# Patient Record
Sex: FEMALE | Race: WHITE | NOT HISPANIC OR LATINO | Employment: PART TIME | URBAN - METROPOLITAN AREA
[De-identification: names, ages, dates, MRNs, and addresses within clinical notes are randomized per-mention and may not be internally consistent; named-entity substitution may affect disease eponyms.]

---

## 2018-04-18 ENCOUNTER — OFFICE VISIT (OUTPATIENT)
Dept: ORTHOPEDIC SURGERY | Facility: CLINIC | Age: 83
End: 2018-04-18

## 2018-04-18 VITALS
HEART RATE: 78 BPM | HEIGHT: 63 IN | WEIGHT: 158.73 LBS | SYSTOLIC BLOOD PRESSURE: 158 MMHG | DIASTOLIC BLOOD PRESSURE: 79 MMHG | BODY MASS INDEX: 28.12 KG/M2

## 2018-04-18 DIAGNOSIS — M19.012 PRIMARY OSTEOARTHRITIS OF LEFT SHOULDER: Primary | ICD-10-CM

## 2018-04-18 PROCEDURE — 99214 OFFICE O/P EST MOD 30 MIN: CPT | Performed by: ORTHOPAEDIC SURGERY

## 2018-04-18 RX ORDER — TRIAMTERENE AND HYDROCHLOROTHIAZIDE 37.5; 25 MG/1; MG/1
1 TABLET ORAL DAILY
COMMUNITY
Start: 2018-03-19

## 2018-04-18 RX ORDER — ALBUTEROL SULFATE 90 UG/1
POWDER, METERED RESPIRATORY (INHALATION)
Status: ON HOLD | COMMUNITY
Start: 2018-01-18 | End: 2022-05-12

## 2018-04-18 RX ORDER — POTASSIUM CHLORIDE 20 MEQ/1
TABLET, EXTENDED RELEASE ORAL
Status: ON HOLD | COMMUNITY
Start: 2018-04-02 | End: 2022-05-12

## 2018-04-18 RX ORDER — TRAZODONE HYDROCHLORIDE 50 MG/1
TABLET ORAL
Status: ON HOLD | COMMUNITY
Start: 2018-04-17 | End: 2022-05-12

## 2018-04-18 NOTE — PROGRESS NOTES
[facility] [place] [facility] Note    Subjective     Chief Complaint   Patient presents with   • Left Shoulder - Pain        HPI    Heidi Tomlinson is a 82 y.o. female. She presents today for evaluation of left shoulder pain.  She has pain in the shoulder for the past 6 months, following no particular injury.  The pain is mild to moderate, continuous, and aching and sharp in quality, associated with stiffness.  She has 2 small lumps, one anteriorly and the other posteriorly, which do not hurt.      There is no problem list on file for this patient.    Past Medical History:   Diagnosis Date   • Arthritis       Past Surgical History:   Procedure Laterality Date   • GALLBLADDER SURGERY  1968   • HIP SURGERY  2011    [facility]   • JOINT REPLACEMENT Left     Left Knee Arthropolasty    • TONSILLECTOMY  2004      Family History   Problem Relation Age of Onset   • Diabetes Mother    • Osteoarthritis Father      Social History     Social History   • Marital status:      Spouse name: N/A   • Number of children: N/A   • Years of education: N/A     Occupational History   • Not on file.     Social History Main Topics   • Smoking status: Never Smoker   • Smokeless tobacco: Never Used   • Alcohol use No   • Drug use: No   • Sexual activity: Defer     Other Topics Concern   • Not on file     Social History Narrative   • No narrative on file      No current outpatient prescriptions on file prior to visit.     No current facility-administered medications on file prior to visit.       Allergies   Allergen Reactions   • Codeine Nausea And Vomiting        Review of Systems   Constitutional: Negative for activity change, appetite change, chills, diaphoresis, fatigue, fever and unexpected weight change.   HENT: Positive for hearing loss. Negative for congestion, dental problem, drooling, ear discharge, ear pain, facial swelling, mouth sores, nosebleeds, postnasal drip, rhinorrhea, sinus pressure, sneezing, sore throat, tinnitus,  "trouble swallowing and voice change.    Eyes: Negative for photophobia, pain, discharge, redness, itching and visual disturbance.   Respiratory: Negative for apnea, cough, choking, chest tightness, shortness of breath, wheezing and stridor.    Cardiovascular: Negative for chest pain, palpitations and leg swelling.   Gastrointestinal: Negative for abdominal distention, abdominal pain, anal bleeding, blood in stool, constipation, diarrhea, nausea, rectal pain and vomiting.   Endocrine: Negative for cold intolerance, heat intolerance, polydipsia, polyphagia and polyuria.   Genitourinary: Negative for decreased urine volume, difficulty urinating, dysuria, enuresis, flank pain, frequency, genital sores, hematuria and urgency.   Musculoskeletal: Positive for arthralgias. Negative for back pain, gait problem, joint swelling, myalgias, neck pain and neck stiffness.   Skin: Negative for color change, pallor, rash and wound.   Allergic/Immunologic: Negative for environmental allergies, food allergies and immunocompromised state.   Neurological: Negative for dizziness, tremors, seizures, syncope, facial asymmetry, speech difficulty, weakness, light-headedness, numbness and headaches.   Hematological: Negative for adenopathy. Does not bruise/bleed easily.   Psychiatric/Behavioral: Negative for agitation, behavioral problems, confusion, decreased concentration, dysphoric mood, hallucinations, self-injury, sleep disturbance and suicidal ideas. The patient is not nervous/anxious and is not hyperactive.         Objective      Physical Exam  /79   Pulse 78   Ht 159 cm (62.6\")   Wt 72 kg (158 lb 11.7 oz)   BMI 28.48 kg/m²     Body mass index is 28.48 kg/m².    General:   Mental Status:  Alert   Appearance: Cooperative, in no acute distress   Build and Nutrition: Well-nourished and well developed female   Orientation: Alert and oriented to person, place and time   Posture: Normal   Gait: Normal    Integument:   Left " shoulder: No skin lesions, no rash, no ecchymosis    Neurologic:   Sensation:    Left hand: Intact to light touch in the digits   Motor:  Left upper extremity: 5/5 deltoid, biceps, triceps, wrist flexors, wrist extensors, and interossei  Vascular:   Left upper extremity: 2+ radial pulse, prompt capillary refill    Upper Extremities:   Left Shoulder:    Tenderness:  None    Swelling:  None    Crepitus: None    Atrophy:  None    Range of motion:  External rotation:  30°       Forward flexion:  130°       Abduction:   100°  Instability:  None  Deformities:  Subcutaneous palpable nodule anterior, and subcutaneous palpable nodule posterior, both well-circumscribed, and slightly mobile, with no overlying skin lesions  Functional testing: Negative drop arm, negative lift-off, positive impingement      Imaging/Studies  Imaging Results (last 24 hours)     Procedure Component Value Units Date/Time    XR Shoulder 2+ View Left [[id number]] Resulted:  04/18/18 1133     Updated:  04/18/18 1134    Narrative:       Left Shoulder Radiographs  Indication: left shoulder pain  Views: AP, outlet and axillary views of the left shoulder    Comparison: no prior studies available for review    Findings:  Advanced arthritic changes are seen, with glenoid and humeral head   osteophytes, with bone-on-bone contact, and cystic formation consistent   with advanced glenohumeral arthritis.              Assessment and Plan     Heidi was seen today for pain.    Diagnoses and all orders for this visit:    Primary osteoarthritis of left shoulder  -     XR Shoulder 2+ View Left  -     MRI Shoulder Left Without Contrast; Future  -     diclofenac (VOLTAREN) 1 % gel gel; Apply 4 g topically 2 (Two) Times a Day.        I reviewed my findings with patient today.  She has advanced left shoulder arthritis.  She does have small subcutaneous nodules anteriorly posteriorly, which may be fluid cysts, or could be small lipomas.  I would like an MRI of her shoulder  and I will see her back after this completed for review.  In the meantime, she will try some Voltaren gel, which is helped first her arthritis in the past.  We may consider an intra-articular injection for her shoulder on the next visit if appropriate.  She is not interested in surgical intervention at this time.        Medical Decision Making  Management Options : prescription/IM medicine  Data/Risk: radiology tests and independent visualization of imaging, lab tests, or EMG/NCV      Shlomo Medrano MD  04/18/18  11:46 AM

## 2018-04-26 ENCOUNTER — HOSPITAL ENCOUNTER (OUTPATIENT)
Dept: MRI IMAGING | Facility: HOSPITAL | Age: 83
Discharge: HOME | End: 2018-04-26
Attending: ORTHOPAEDIC SURGERY | Admitting: ORTHOPAEDIC SURGERY

## 2018-04-26 DIAGNOSIS — M19.012 PRIMARY OSTEOARTHRITIS OF LEFT SHOULDER: ICD-10-CM

## 2018-04-26 PROCEDURE — 73221 MRI JOINT UPR EXTREM W/O DYE: CPT

## 2018-05-09 ENCOUNTER — OFFICE VISIT (OUTPATIENT)
Dept: ORTHOPEDIC SURGERY | Facility: CLINIC | Age: 83
End: 2018-05-09

## 2018-05-09 VITALS
SYSTOLIC BLOOD PRESSURE: 153 MMHG | HEART RATE: 82 BPM | WEIGHT: 158.73 LBS | DIASTOLIC BLOOD PRESSURE: 67 MMHG | HEIGHT: 63 IN | BODY MASS INDEX: 28.12 KG/M2

## 2018-05-09 DIAGNOSIS — M19.012 PRIMARY OSTEOARTHRITIS OF LEFT SHOULDER: Primary | ICD-10-CM

## 2018-05-09 PROCEDURE — 99214 OFFICE O/P EST MOD 30 MIN: CPT | Performed by: ORTHOPAEDIC SURGERY

## 2018-05-09 PROCEDURE — 20610 DRAIN/INJ JOINT/BURSA W/O US: CPT | Performed by: ORTHOPAEDIC SURGERY

## 2018-05-09 RX ORDER — TRIAMCINOLONE ACETONIDE 40 MG/ML
40 INJECTION, SUSPENSION INTRA-ARTICULAR; INTRAMUSCULAR
Status: COMPLETED | OUTPATIENT
Start: 2018-05-09 | End: 2018-05-09

## 2018-05-09 RX ORDER — ROPIVACAINE HYDROCHLORIDE 5 MG/ML
4 INJECTION, SOLUTION EPIDURAL; INFILTRATION; PERINEURAL
Status: COMPLETED | OUTPATIENT
Start: 2018-05-09 | End: 2018-05-09

## 2018-05-09 RX ADMIN — ROPIVACAINE HYDROCHLORIDE 4 ML: 5 INJECTION, SOLUTION EPIDURAL; INFILTRATION; PERINEURAL at 14:11

## 2018-05-09 RX ADMIN — TRIAMCINOLONE ACETONIDE 40 MG: 40 INJECTION, SUSPENSION INTRA-ARTICULAR; INTRAMUSCULAR at 14:11

## 2018-05-09 NOTE — PROGRESS NOTES
[facility] [place] [facility] Note    Subjective     Chief Complaint   Patient presents with   • Left Shoulder - Follow-up     Left shoulder MRI follow up.         HPI    Heidi Tomlinson is a 82 y.o. female. She follows up today for the MRI results of her left shoulder.  No new complaints today.  Still having pain, which is moderate in severity, aching in quality, associated with stiffness.  She is not interested in surgery.      There is no problem list on file for this patient.    Past Medical History:   Diagnosis Date   • Arthritis       Past Surgical History:   Procedure Laterality Date   • GALLBLADDER SURGERY  1968   • HIP SURGERY  2011    [facility]   • JOINT REPLACEMENT Left     Left Knee Arthropolasty    • TONSILLECTOMY  2004      Family History   Problem Relation Age of Onset   • Diabetes Mother    • Osteoarthritis Father      Social History     Social History   • Marital status:      Spouse name: N/A   • Number of children: N/A   • Years of education: N/A     Occupational History   • Not on file.     Social History Main Topics   • Smoking status: Never Smoker   • Smokeless tobacco: Never Used   • Alcohol use No   • Drug use: No   • Sexual activity: Defer     Other Topics Concern   • Not on file     Social History Narrative   • No narrative on file      Current Outpatient Prescriptions on File Prior to Visit   Medication Sig Dispense Refill   • diclofenac (VOLTAREN) 1 % gel gel Apply 4 g topically 2 (Two) Times a Day. 1 tube 5   • potassium chloride (K-DUR,KLOR-CON) 20 MEQ CR tablet      • PROAIR RESPICLICK 108 (90 Base) MCG/ACT inhaler      • traZODone (DESYREL) 50 MG tablet      • triamterene-hydrochlorothiazide (MAXZIDE-25) 37.5-25 MG per tablet        No current facility-administered medications on file prior to visit.       Allergies   Allergen Reactions   • Codeine Nausea And Vomiting        Review of Systems   Constitutional: Negative for activity change, appetite change, chills, diaphoresis,  "fatigue, fever and unexpected weight change.   HENT: Negative for congestion, dental problem, drooling, ear discharge, ear pain, facial swelling, hearing loss, mouth sores, nosebleeds, postnasal drip, rhinorrhea, sinus pressure, sneezing, sore throat, tinnitus, trouble swallowing and voice change.    Eyes: Negative for photophobia, pain, discharge, redness, itching and visual disturbance.   Respiratory: Negative for apnea, cough, choking, chest tightness, shortness of breath, wheezing and stridor.    Cardiovascular: Negative for chest pain, palpitations and leg swelling.   Gastrointestinal: Negative for abdominal distention, abdominal pain, anal bleeding, blood in stool, constipation, diarrhea, nausea, rectal pain and vomiting.   Endocrine: Negative for cold intolerance, heat intolerance, polydipsia, polyphagia and polyuria.   Genitourinary: Negative for decreased urine volume, difficulty urinating, dysuria, enuresis, flank pain, frequency, genital sores, hematuria and urgency.   Musculoskeletal: Positive for arthralgias. Negative for back pain, gait problem, joint swelling, myalgias, neck pain and neck stiffness.   Skin: Negative for color change, pallor, rash and wound.   Allergic/Immunologic: Negative for environmental allergies, food allergies and immunocompromised state.   Neurological: Negative for dizziness, tremors, seizures, syncope, facial asymmetry, speech difficulty, weakness, light-headedness, numbness and headaches.   Hematological: Negative for adenopathy. Does not bruise/bleed easily.   Psychiatric/Behavioral: Negative for agitation, behavioral problems, confusion, decreased concentration, dysphoric mood, hallucinations, self-injury, sleep disturbance and suicidal ideas. The patient is not nervous/anxious and is not hyperactive.         Objective      Physical Exam  /67   Pulse 82   Ht 159 cm (62.6\")   Wt 72 kg (158 lb 11.7 oz)   BMI 28.48 kg/m²     Body mass index is 28.48 " kg/m².    General:   Mental Status:  Alert   Appearance: Cooperative, in no acute distress   Build and Nutrition: Well-nourished and well developed female   Orientation: Alert and oriented to person, place and time   Posture: Normal   Gait: Normal    Integument:   Left shoulder: No skin lesions, no rash, no ecchymosis    Upper Extremities:   Left Shoulder:    Tenderness:  None    Swelling:  None    Range of motion:  External rotation:  30°       Forward flexion:  110°       Abduction:   90°  Deformities:  None  Functional testing: Negative drop arm, negative lift-off, positive impingement      Imaging/Studies  MRI left shoulder:  IMPRESSION:  1. 2 cm subcutaneous lipoma dorsal to the shoulder. No visible anterior  subcutaneous mass.  2. Advanced shoulder joint DJD, with fairly large effusion, multiple  intra-articular loose bodies, extensive degenerative osteophyte  formation, and erosion of the posterior and inferior portions of the  glenoid labrum.  3. Degenerated and presumably torn distal supraspinatus tendon, but no  obvious bennie disruption or muscle atrophy.     DICTATED:     04/27/2018  EDITED/ls :     04/27/2018      This report was finalized on 4/28/2018 9:17 AM by DR. Timmy Piedra MD.      Assessment and Plan     Heidi was seen today for follow-up.    Diagnoses and all orders for this visit:    Primary osteoarthritis of left shoulder  -     Large Joint Arthrocentesis  -     Ambulatory Referral to Physical Therapy Evaluate and treat        I reviewed my findings with patient today.  Plain films and MRI show advanced degeneration in the shoulder, with thinning of the rotator cuff, but no ebnnie tear.  At this point, she would like to have an intra-articular injection, and is not interested in surgical intervention.  She is a good candidate for shoulder arthroplasty surgery, and certainly if she desires to consider that in the future, we will make a referral to a shoulder specialist.    Of note, she had about  80% relief just a few minutes following the injection.    Return in about 3 months (around 8/9/2018).      Medical Decision Making  Management Options : prescription/IM medicine  Data/Risk: independent visualization of imaging, lab tests, or EMG/NCV      Shlomo Medrano MD  05/09/18  2:23 PM

## 2018-05-09 NOTE — PROGRESS NOTES
Procedure   Large Joint Arthrocentesis  Date/Time: 5/9/2018 2:11 PM  Consent given by: patient  Site marked: site marked  Timeout: Immediately prior to procedure a time out was called to verify the correct patient, procedure, equipment, support staff and site/side marked as required   Supporting Documentation  Indications: pain   Procedure Details  Location: shoulder - L glenohumeral  Preparation: Patient was prepped and draped in the usual sterile fashion  Needle size: 22 G  Approach: anterolateral  Medications administered: 4 mL ropivacaine 0.5 %; 40 mg triamcinolone acetonide 40 MG/ML  Patient tolerance: patient tolerated the procedure well with no immediate complications

## 2018-10-31 ENCOUNTER — OFFICE VISIT (OUTPATIENT)
Dept: ORTHOPEDIC SURGERY | Facility: CLINIC | Age: 83
End: 2018-10-31

## 2018-10-31 ENCOUNTER — LAB (OUTPATIENT)
Dept: LAB | Facility: HOSPITAL | Age: 83
End: 2018-10-31

## 2018-10-31 VITALS — HEIGHT: 63 IN | WEIGHT: 156.53 LBS | BODY MASS INDEX: 27.73 KG/M2 | HEART RATE: 86 BPM | OXYGEN SATURATION: 97 %

## 2018-10-31 DIAGNOSIS — Z96.642 HISTORY OF LEFT HIP REPLACEMENT: ICD-10-CM

## 2018-10-31 DIAGNOSIS — M70.62 TROCHANTERIC BURSITIS OF LEFT HIP: ICD-10-CM

## 2018-10-31 DIAGNOSIS — M25.552 LEFT HIP PAIN: ICD-10-CM

## 2018-10-31 DIAGNOSIS — M25.552 LEFT HIP PAIN: Primary | ICD-10-CM

## 2018-10-31 LAB
BASOPHILS # BLD AUTO: 0.02 10*3/MM3 (ref 0–0.2)
BASOPHILS NFR BLD AUTO: 0.3 % (ref 0–1)
CRP SERPL-MCNC: 0.03 MG/DL (ref 0–1)
DEPRECATED RDW RBC AUTO: 39.6 FL (ref 37–54)
EOSINOPHIL # BLD AUTO: 0.03 10*3/MM3 (ref 0–0.3)
EOSINOPHIL NFR BLD AUTO: 0.5 % (ref 0–3)
ERYTHROCYTE [DISTWIDTH] IN BLOOD BY AUTOMATED COUNT: 12.9 % (ref 11.3–14.5)
ERYTHROCYTE [SEDIMENTATION RATE] IN BLOOD: 12 MM/HR (ref 0–30)
HCT VFR BLD AUTO: 42.2 % (ref 34.5–44)
HGB BLD-MCNC: 14.4 G/DL (ref 11.5–15.5)
IMM GRANULOCYTES # BLD: 0.01 10*3/MM3 (ref 0–0.03)
IMM GRANULOCYTES NFR BLD: 0.2 % (ref 0–0.6)
LYMPHOCYTES # BLD AUTO: 1.09 10*3/MM3 (ref 0.6–4.8)
LYMPHOCYTES NFR BLD AUTO: 17.9 % (ref 24–44)
MCH RBC QN AUTO: 29.1 PG (ref 27–31)
MCHC RBC AUTO-ENTMCNC: 34.1 G/DL (ref 32–36)
MCV RBC AUTO: 85.4 FL (ref 80–99)
MONOCYTES # BLD AUTO: 0.42 10*3/MM3 (ref 0–1)
MONOCYTES NFR BLD AUTO: 6.9 % (ref 0–12)
NEUTROPHILS # BLD AUTO: 4.53 10*3/MM3 (ref 1.5–8.3)
NEUTROPHILS NFR BLD AUTO: 74.4 % (ref 41–71)
PLATELET # BLD AUTO: 292 10*3/MM3 (ref 150–450)
PMV BLD AUTO: 9.2 FL (ref 6–12)
RBC # BLD AUTO: 4.94 10*6/MM3 (ref 3.89–5.14)
WBC NRBC COR # BLD: 6.09 10*3/MM3 (ref 3.5–10.8)

## 2018-10-31 PROCEDURE — 85652 RBC SED RATE AUTOMATED: CPT

## 2018-10-31 PROCEDURE — 85025 COMPLETE CBC W/AUTO DIFF WBC: CPT

## 2018-10-31 PROCEDURE — 86140 C-REACTIVE PROTEIN: CPT

## 2018-10-31 PROCEDURE — 36415 COLL VENOUS BLD VENIPUNCTURE: CPT

## 2018-10-31 PROCEDURE — 99214 OFFICE O/P EST MOD 30 MIN: CPT | Performed by: PHYSICIAN ASSISTANT

## 2018-11-01 ENCOUNTER — TELEPHONE (OUTPATIENT)
Dept: ORTHOPEDIC SURGERY | Facility: CLINIC | Age: 83
End: 2018-11-01

## 2018-11-01 NOTE — TELEPHONE ENCOUNTER
----- Message from María Spencer PA-C sent at 11/1/2018  3:09 PM EDT -----  Can you please call the patient was her know her labs that were drawn yesterday were normal.  There is no evidence of infection in her hip.  Thanks, María

## 2018-11-01 NOTE — TELEPHONE ENCOUNTER
Contacted patient per María and advised her that her labs that were drawn were normal. Patient had no other questions.

## 2022-04-28 ENCOUNTER — APPOINTMENT (OUTPATIENT)
Dept: PREADMISSION TESTING | Facility: HOSPITAL | Age: 87
End: 2022-04-28

## 2022-05-10 ENCOUNTER — APPOINTMENT (OUTPATIENT)
Dept: PREADMISSION TESTING | Facility: HOSPITAL | Age: 87
End: 2022-05-10

## 2022-05-10 ENCOUNTER — PRE-ADMISSION TESTING (OUTPATIENT)
Dept: PREADMISSION TESTING | Facility: HOSPITAL | Age: 87
End: 2022-05-10

## 2022-05-10 VITALS — BODY MASS INDEX: 24.45 KG/M2 | WEIGHT: 138.01 LBS | HEIGHT: 63 IN

## 2022-05-10 LAB
ALBUMIN SERPL-MCNC: 4.1 G/DL (ref 3.5–5.2)
ALBUMIN/GLOB SERPL: 1.2 G/DL
ALP SERPL-CCNC: 95 U/L (ref 39–117)
ALT SERPL W P-5'-P-CCNC: 11 U/L (ref 1–33)
ANION GAP SERPL CALCULATED.3IONS-SCNC: 12 MMOL/L (ref 5–15)
APTT PPP: 29.5 SECONDS (ref 22–39)
AST SERPL-CCNC: 20 U/L (ref 1–32)
BASOPHILS # BLD AUTO: 0.04 10*3/MM3 (ref 0–0.2)
BASOPHILS NFR BLD AUTO: 0.7 % (ref 0–1.5)
BILIRUB SERPL-MCNC: 0.3 MG/DL (ref 0–1.2)
BUN SERPL-MCNC: 15 MG/DL (ref 8–23)
BUN/CREAT SERPL: 27.3 (ref 7–25)
CALCIUM SPEC-SCNC: 9.5 MG/DL (ref 8.6–10.5)
CHLORIDE SERPL-SCNC: 97 MMOL/L (ref 98–107)
CO2 SERPL-SCNC: 27 MMOL/L (ref 22–29)
CREAT SERPL-MCNC: 0.55 MG/DL (ref 0.57–1)
DEPRECATED RDW RBC AUTO: 44.8 FL (ref 37–54)
EGFRCR SERPLBLD CKD-EPI 2021: 90.5 ML/MIN/1.73
EOSINOPHIL # BLD AUTO: 0.05 10*3/MM3 (ref 0–0.4)
EOSINOPHIL NFR BLD AUTO: 0.9 % (ref 0.3–6.2)
ERYTHROCYTE [DISTWIDTH] IN BLOOD BY AUTOMATED COUNT: 16.4 % (ref 12.3–15.4)
GLOBULIN UR ELPH-MCNC: 3.3 GM/DL
GLUCOSE SERPL-MCNC: 106 MG/DL (ref 65–99)
HBA1C MFR BLD: 5.4 % (ref 4.8–5.6)
HCT VFR BLD AUTO: 32.9 % (ref 34–46.6)
HGB BLD-MCNC: 10.2 G/DL (ref 12–15.9)
IMM GRANULOCYTES # BLD AUTO: 0.01 10*3/MM3 (ref 0–0.05)
IMM GRANULOCYTES NFR BLD AUTO: 0.2 % (ref 0–0.5)
INR PPP: 1.03 (ref 0.84–1.13)
LYMPHOCYTES # BLD AUTO: 0.9 10*3/MM3 (ref 0.7–3.1)
LYMPHOCYTES NFR BLD AUTO: 15.5 % (ref 19.6–45.3)
MCH RBC QN AUTO: 23.5 PG (ref 26.6–33)
MCHC RBC AUTO-ENTMCNC: 31 G/DL (ref 31.5–35.7)
MCV RBC AUTO: 75.8 FL (ref 79–97)
MONOCYTES # BLD AUTO: 0.39 10*3/MM3 (ref 0.1–0.9)
MONOCYTES NFR BLD AUTO: 6.7 % (ref 5–12)
NEUTROPHILS NFR BLD AUTO: 4.42 10*3/MM3 (ref 1.7–7)
NEUTROPHILS NFR BLD AUTO: 76 % (ref 42.7–76)
NRBC BLD AUTO-RTO: 0 /100 WBC (ref 0–0.2)
PLATELET # BLD AUTO: 407 10*3/MM3 (ref 140–450)
PMV BLD AUTO: 8 FL (ref 6–12)
POTASSIUM SERPL-SCNC: 3.5 MMOL/L (ref 3.5–5.2)
PROT SERPL-MCNC: 7.4 G/DL (ref 6–8.5)
PROTHROMBIN TIME: 13.4 SECONDS (ref 11.4–14.4)
QT INTERVAL: 364 MS
QTC INTERVAL: 440 MS
RBC # BLD AUTO: 4.34 10*6/MM3 (ref 3.77–5.28)
SARS-COV-2 RNA PNL SPEC NAA+PROBE: NOT DETECTED
SODIUM SERPL-SCNC: 136 MMOL/L (ref 136–145)
WBC NRBC COR # BLD: 5.81 10*3/MM3 (ref 3.4–10.8)

## 2022-05-10 PROCEDURE — 85730 THROMBOPLASTIN TIME PARTIAL: CPT

## 2022-05-10 PROCEDURE — 83036 HEMOGLOBIN GLYCOSYLATED A1C: CPT

## 2022-05-10 PROCEDURE — C9803 HOPD COVID-19 SPEC COLLECT: HCPCS

## 2022-05-10 PROCEDURE — 85025 COMPLETE CBC W/AUTO DIFF WBC: CPT

## 2022-05-10 PROCEDURE — U0005 INFEC AGEN DETEC AMPLI PROBE: HCPCS

## 2022-05-10 PROCEDURE — 93010 ELECTROCARDIOGRAM REPORT: CPT | Performed by: INTERNAL MEDICINE

## 2022-05-10 PROCEDURE — 36415 COLL VENOUS BLD VENIPUNCTURE: CPT

## 2022-05-10 PROCEDURE — 85610 PROTHROMBIN TIME: CPT

## 2022-05-10 PROCEDURE — U0004 COV-19 TEST NON-CDC HGH THRU: HCPCS

## 2022-05-10 PROCEDURE — 93005 ELECTROCARDIOGRAM TRACING: CPT

## 2022-05-10 PROCEDURE — 80053 COMPREHEN METABOLIC PANEL: CPT

## 2022-05-10 RX ORDER — TRAMADOL HYDROCHLORIDE 50 MG/1
50 TABLET ORAL EVERY 8 HOURS PRN
COMMUNITY
End: 2022-05-16 | Stop reason: HOSPADM

## 2022-05-10 RX ORDER — MIRTAZAPINE 15 MG/1
15 TABLET, FILM COATED ORAL NIGHTLY
Status: ON HOLD | COMMUNITY
End: 2022-05-12

## 2022-05-10 RX ORDER — TRAZODONE HYDROCHLORIDE 100 MG/1
TABLET ORAL EVERY 12 HOURS SCHEDULED
COMMUNITY

## 2022-05-10 RX ORDER — POTASSIUM CHLORIDE 1.5 G/1.77G
20 POWDER, FOR SOLUTION ORAL DAILY
COMMUNITY

## 2022-05-10 NOTE — PAT
An arrival time for procedure was not given during PAT visit. If patient had any questions or concerns about their arrival time, they were instructed to contact their surgeon/physician.  Additionally, if the patient referred to an arrival time that was acquired from their my chart account, patient was encouraged to verify that time with their surgeon/physician.  NO arrival times given in Pre Admission Testing Department.    Patient instructed to drink 20 ounces (or until full) of Gatorade and it needs to be completed 1 hour (for Main OR patients) or 2 hours (scheduled  section patients) before given arrival time for procedure (NO RED Gatorade)    Patient verbalized understanding.    Patient to apply Chlorhexadine wipes  to surgical area (as instructed) the night before procedure and the AM of procedure. Wipes provided.    Patient viewed general PAT education video as instructed in their preoperative information received from their surgeon.  Patient stated the general PAT education video was viewed in its entirety and survey completed.  Copies of PAT general education handouts (Incentive Spirometry, Meds to Beds Program, Patient Belongings, Pre-op skin preparation instructions, Blood Glucose testing, Visitor policy, Surgery FAQ, Code H) distributed to patient if not printed. Education related to the PAT pass and skin preparation for surgery (if applicable) completed in PAT as a reinforcement to PAT education video. Patient instructed to return PAT pass provided today as well as completed skin preparation sheet (if applicable) on the day of procedure.     Additionally if patient had not viewed video yet but intended to view it at home or in our waiting area, then referred them to the handout with QR code/link provided during PAT visit.  Instructed patient to complete survey after viewing the video in its entirety.  Encouraged patient/family to read PAT general education handouts thoroughly and notify PAT staff  with any questions or concerns. Patient verbalized understanding of all information and priority content.    Patient denies any current skin issues.     COVID test and EKG collected in PAT.

## 2022-05-11 ENCOUNTER — ANESTHESIA EVENT (OUTPATIENT)
Dept: PERIOP | Facility: HOSPITAL | Age: 87
End: 2022-05-11

## 2022-05-11 RX ORDER — SODIUM CHLORIDE 0.9 % (FLUSH) 0.9 %
10 SYRINGE (ML) INJECTION AS NEEDED
Status: CANCELLED | OUTPATIENT
Start: 2022-05-11

## 2022-05-11 RX ORDER — FAMOTIDINE 10 MG/ML
20 INJECTION, SOLUTION INTRAVENOUS ONCE
Status: CANCELLED | OUTPATIENT
Start: 2022-05-11 | End: 2022-05-11

## 2022-05-11 RX ORDER — SODIUM CHLORIDE 0.9 % (FLUSH) 0.9 %
10 SYRINGE (ML) INJECTION EVERY 12 HOURS SCHEDULED
Status: CANCELLED | OUTPATIENT
Start: 2022-05-11

## 2022-05-12 ENCOUNTER — APPOINTMENT (OUTPATIENT)
Dept: GENERAL RADIOLOGY | Facility: HOSPITAL | Age: 87
End: 2022-05-12

## 2022-05-12 ENCOUNTER — ANESTHESIA EVENT CONVERTED (OUTPATIENT)
Dept: ANESTHESIOLOGY | Facility: HOSPITAL | Age: 87
End: 2022-05-12

## 2022-05-12 ENCOUNTER — ANESTHESIA (OUTPATIENT)
Dept: PERIOP | Facility: HOSPITAL | Age: 87
End: 2022-05-12

## 2022-05-12 ENCOUNTER — HOSPITAL ENCOUNTER (OUTPATIENT)
Facility: HOSPITAL | Age: 87
Discharge: NURSING, REHAB OR LONG-TERM CARE | End: 2022-05-16
Attending: ORTHOPAEDIC SURGERY | Admitting: ORTHOPAEDIC SURGERY

## 2022-05-12 DIAGNOSIS — Z96.612 S/P REVERSE TOTAL SHOULDER ARTHROPLASTY, LEFT: Primary | ICD-10-CM

## 2022-05-12 PROBLEM — M19.012 GLENOHUMERAL ARTHRITIS, LEFT: Status: ACTIVE | Noted: 2022-05-12

## 2022-05-12 PROCEDURE — 97535 SELF CARE MNGMENT TRAINING: CPT

## 2022-05-12 PROCEDURE — C1776 JOINT DEVICE (IMPLANTABLE): HCPCS | Performed by: ORTHOPAEDIC SURGERY

## 2022-05-12 PROCEDURE — 23472 RECONSTRUCT SHOULDER JOINT: CPT | Performed by: PHYSICIAN ASSISTANT

## 2022-05-12 PROCEDURE — G0378 HOSPITAL OBSERVATION PER HR: HCPCS

## 2022-05-12 PROCEDURE — 25010000002 DEXAMETHASONE SODIUM PHOSPHATE 10 MG/ML SOLUTION: Performed by: NURSE ANESTHETIST, CERTIFIED REGISTERED

## 2022-05-12 PROCEDURE — A9270 NON-COVERED ITEM OR SERVICE: HCPCS | Performed by: ORTHOPAEDIC SURGERY

## 2022-05-12 PROCEDURE — S0260 H&P FOR SURGERY: HCPCS | Performed by: PHYSICIAN ASSISTANT

## 2022-05-12 PROCEDURE — 97110 THERAPEUTIC EXERCISES: CPT

## 2022-05-12 PROCEDURE — 25010000002 DEXAMETHASONE PER 1 MG: Performed by: NURSE ANESTHETIST, CERTIFIED REGISTERED

## 2022-05-12 PROCEDURE — 63710000001 OXYCODONE 5 MG TABLET: Performed by: ORTHOPAEDIC SURGERY

## 2022-05-12 PROCEDURE — L3670 SO ACRO/CLAV CAN WEB PRE OTS: HCPCS | Performed by: ORTHOPAEDIC SURGERY

## 2022-05-12 PROCEDURE — 25010000002 PROPOFOL 10 MG/ML EMULSION: Performed by: NURSE ANESTHETIST, CERTIFIED REGISTERED

## 2022-05-12 PROCEDURE — 25010000002 CEFAZOLIN IN DEXTROSE 2-4 GM/100ML-% SOLUTION: Performed by: ORTHOPAEDIC SURGERY

## 2022-05-12 PROCEDURE — 25010000002 HYDROMORPHONE PER 4 MG: Performed by: ORTHOPAEDIC SURGERY

## 2022-05-12 PROCEDURE — 25010000002 ONDANSETRON PER 1 MG: Performed by: NURSE ANESTHETIST, CERTIFIED REGISTERED

## 2022-05-12 PROCEDURE — 0 LIDOCAINE 1 % SOLUTION: Performed by: NURSE ANESTHETIST, CERTIFIED REGISTERED

## 2022-05-12 PROCEDURE — 76942 ECHO GUIDE FOR BIOPSY: CPT | Performed by: ORTHOPAEDIC SURGERY

## 2022-05-12 PROCEDURE — 25010000002 PHENYLEPHRINE 10 MG/ML SOLUTION 1 ML VIAL: Performed by: NURSE ANESTHETIST, CERTIFIED REGISTERED

## 2022-05-12 PROCEDURE — 25010000002 FENTANYL CITRATE (PF) 50 MCG/ML SOLUTION: Performed by: NURSE ANESTHETIST, CERTIFIED REGISTERED

## 2022-05-12 PROCEDURE — 97166 OT EVAL MOD COMPLEX 45 MIN: CPT

## 2022-05-12 PROCEDURE — 73020 X-RAY EXAM OF SHOULDER: CPT

## 2022-05-12 PROCEDURE — C1889 IMPLANT/INSERT DEVICE, NOC: HCPCS | Performed by: ORTHOPAEDIC SURGERY

## 2022-05-12 PROCEDURE — 25010000002 ROPIVACAINE PER 1 MG: Performed by: NURSE ANESTHETIST, CERTIFIED REGISTERED

## 2022-05-12 PROCEDURE — 25010000002 VANCOMYCIN 1 G RECONSTITUTED SOLUTION: Performed by: ORTHOPAEDIC SURGERY

## 2022-05-12 DEVICE — SCRW PERIPH 5X34MM: Type: IMPLANTABLE DEVICE | Site: SHOULDER | Status: FUNCTIONAL

## 2022-05-12 DEVICE — SUT FW #2 W/TPR NDL 1/2 CIR 38IN 97CM 26.5MM BLU: Type: IMPLANTABLE DEVICE | Site: SHOULDER | Status: FUNCTIONAL

## 2022-05-12 DEVICE — SCRW AEQUALIS PERFORM PERIPH 5X30MM: Type: IMPLANTABLE DEVICE | Site: SHOULDER | Status: FUNCTIONAL

## 2022-05-12 DEVICE — ABSORBABLE HEMOSTAT (OXIDIZED REGENERATED CELLULOSE, U.S.P.)
Type: IMPLANTABLE DEVICE | Site: SHOULDER | Status: FUNCTIONAL
Brand: SURGICEL

## 2022-05-12 DEVICE — IMPLANTABLE DEVICE
Type: IMPLANTABLE DEVICE | Site: SHOULDER | Status: FUNCTIONAL
Brand: TORNIER PERFORM® REVERSED AUGMENTED GLENOID

## 2022-05-12 DEVICE — IMPLANTABLE DEVICE
Type: IMPLANTABLE DEVICE | Site: SHOULDER | Status: FUNCTIONAL
Brand: TORNIER PERFORM™ HUMERAL SYSTEM

## 2022-05-12 DEVICE — SCRW PERIPH AEQUALIS 5X18MM NS: Type: IMPLANTABLE DEVICE | Site: SHOULDER | Status: FUNCTIONAL

## 2022-05-12 DEVICE — IMPLANTABLE DEVICE
Type: IMPLANTABLE DEVICE | Site: SHOULDER | Status: FUNCTIONAL
Brand: TORNIER PERFORM® REVERSED GLENOID

## 2022-05-12 DEVICE — IMPLANTABLE DEVICE: Type: IMPLANTABLE DEVICE | Site: SHOULDER | Status: FUNCTIONAL

## 2022-05-12 DEVICE — POST SHDLR AEQUALIS PERFORM REV PRSS/FIT SHT 7MM: Type: IMPLANTABLE DEVICE | Site: SHOULDER | Status: FUNCTIONAL

## 2022-05-12 DEVICE — SCRW AEQUALIS PERFORM PERIPH 5X22MM: Type: IMPLANTABLE DEVICE | Site: SHOULDER | Status: FUNCTIONAL

## 2022-05-12 RX ORDER — ACETAMINOPHEN 650 MG/1
650 SUPPOSITORY RECTAL EVERY 4 HOURS PRN
Status: DISCONTINUED | OUTPATIENT
Start: 2022-05-12 | End: 2022-05-14

## 2022-05-12 RX ORDER — LIDOCAINE HYDROCHLORIDE 10 MG/ML
0.5 INJECTION, SOLUTION EPIDURAL; INFILTRATION; INTRACAUDAL; PERINEURAL ONCE AS NEEDED
Status: COMPLETED | OUTPATIENT
Start: 2022-05-12 | End: 2022-05-12

## 2022-05-12 RX ORDER — CEFAZOLIN SODIUM 2 G/100ML
2 INJECTION, SOLUTION INTRAVENOUS EVERY 8 HOURS
Status: COMPLETED | OUTPATIENT
Start: 2022-05-12 | End: 2022-05-13

## 2022-05-12 RX ORDER — MIDAZOLAM HYDROCHLORIDE 1 MG/ML
0.5 INJECTION INTRAMUSCULAR; INTRAVENOUS
Status: DISCONTINUED | OUTPATIENT
Start: 2022-05-12 | End: 2022-05-12 | Stop reason: HOSPADM

## 2022-05-12 RX ORDER — ACETAMINOPHEN 325 MG/1
650 TABLET ORAL EVERY 4 HOURS PRN
Status: DISCONTINUED | OUTPATIENT
Start: 2022-05-12 | End: 2022-05-14

## 2022-05-12 RX ORDER — BUPIVACAINE HYDROCHLORIDE 2.5 MG/ML
INJECTION, SOLUTION EPIDURAL; INFILTRATION; INTRACAUDAL
Status: COMPLETED | OUTPATIENT
Start: 2022-05-12 | End: 2022-05-12

## 2022-05-12 RX ORDER — PROPOFOL 10 MG/ML
VIAL (ML) INTRAVENOUS AS NEEDED
Status: DISCONTINUED | OUTPATIENT
Start: 2022-05-12 | End: 2022-05-12 | Stop reason: SURG

## 2022-05-12 RX ORDER — LABETALOL HYDROCHLORIDE 5 MG/ML
10 INJECTION, SOLUTION INTRAVENOUS EVERY 4 HOURS PRN
Status: DISCONTINUED | OUTPATIENT
Start: 2022-05-12 | End: 2022-05-16 | Stop reason: HOSPADM

## 2022-05-12 RX ORDER — DEXAMETHASONE SODIUM PHOSPHATE 4 MG/ML
INJECTION, SOLUTION INTRA-ARTICULAR; INTRALESIONAL; INTRAMUSCULAR; INTRAVENOUS; SOFT TISSUE AS NEEDED
Status: DISCONTINUED | OUTPATIENT
Start: 2022-05-12 | End: 2022-05-12 | Stop reason: SURG

## 2022-05-12 RX ORDER — ACETAMINOPHEN 500 MG
1000 TABLET ORAL ONCE
Status: COMPLETED | OUTPATIENT
Start: 2022-05-12 | End: 2022-05-12

## 2022-05-12 RX ORDER — SODIUM CHLORIDE, SODIUM LACTATE, POTASSIUM CHLORIDE, CALCIUM CHLORIDE 600; 310; 30; 20 MG/100ML; MG/100ML; MG/100ML; MG/100ML
9 INJECTION, SOLUTION INTRAVENOUS CONTINUOUS
Status: DISCONTINUED | OUTPATIENT
Start: 2022-05-12 | End: 2022-05-16

## 2022-05-12 RX ORDER — SODIUM CHLORIDE 450 MG/100ML
50 INJECTION, SOLUTION INTRAVENOUS CONTINUOUS
Status: DISCONTINUED | OUTPATIENT
Start: 2022-05-12 | End: 2022-05-13

## 2022-05-12 RX ORDER — NALOXONE HCL 0.4 MG/ML
0.1 VIAL (ML) INJECTION
Status: DISCONTINUED | OUTPATIENT
Start: 2022-05-12 | End: 2022-05-16 | Stop reason: HOSPADM

## 2022-05-12 RX ORDER — HYDROMORPHONE HYDROCHLORIDE 1 MG/ML
0.5 INJECTION, SOLUTION INTRAMUSCULAR; INTRAVENOUS; SUBCUTANEOUS
Status: DISCONTINUED | OUTPATIENT
Start: 2022-05-12 | End: 2022-05-16 | Stop reason: HOSPADM

## 2022-05-12 RX ORDER — CEFAZOLIN SODIUM 2 G/100ML
2 INJECTION, SOLUTION INTRAVENOUS ONCE
Status: COMPLETED | OUTPATIENT
Start: 2022-05-12 | End: 2022-05-12

## 2022-05-12 RX ORDER — VANCOMYCIN HYDROCHLORIDE 1 G/20ML
INJECTION, POWDER, LYOPHILIZED, FOR SOLUTION INTRAVENOUS AS NEEDED
Status: DISCONTINUED | OUTPATIENT
Start: 2022-05-12 | End: 2022-05-12 | Stop reason: HOSPADM

## 2022-05-12 RX ORDER — OXYCODONE HYDROCHLORIDE 5 MG/1
5 TABLET ORAL EVERY 4 HOURS PRN
Status: DISCONTINUED | OUTPATIENT
Start: 2022-05-12 | End: 2022-05-16 | Stop reason: HOSPADM

## 2022-05-12 RX ORDER — PREGABALIN 75 MG/1
75 CAPSULE ORAL ONCE
Status: COMPLETED | OUTPATIENT
Start: 2022-05-12 | End: 2022-05-12

## 2022-05-12 RX ORDER — ONDANSETRON 2 MG/ML
INJECTION INTRAMUSCULAR; INTRAVENOUS AS NEEDED
Status: DISCONTINUED | OUTPATIENT
Start: 2022-05-12 | End: 2022-05-12 | Stop reason: SURG

## 2022-05-12 RX ORDER — ACETAMINOPHEN 650 MG
TABLET, EXTENDED RELEASE ORAL AS NEEDED
Status: DISCONTINUED | OUTPATIENT
Start: 2022-05-12 | End: 2022-05-12 | Stop reason: HOSPADM

## 2022-05-12 RX ORDER — LIDOCAINE HYDROCHLORIDE 10 MG/ML
INJECTION, SOLUTION INFILTRATION; PERINEURAL AS NEEDED
Status: DISCONTINUED | OUTPATIENT
Start: 2022-05-12 | End: 2022-05-12 | Stop reason: SURG

## 2022-05-12 RX ORDER — ROCURONIUM BROMIDE 10 MG/ML
INJECTION, SOLUTION INTRAVENOUS AS NEEDED
Status: DISCONTINUED | OUTPATIENT
Start: 2022-05-12 | End: 2022-05-12 | Stop reason: SURG

## 2022-05-12 RX ORDER — ONDANSETRON 2 MG/ML
4 INJECTION INTRAMUSCULAR; INTRAVENOUS EVERY 6 HOURS PRN
Status: DISCONTINUED | OUTPATIENT
Start: 2022-05-12 | End: 2022-05-16 | Stop reason: HOSPADM

## 2022-05-12 RX ORDER — MAGNESIUM HYDROXIDE 1200 MG/15ML
LIQUID ORAL AS NEEDED
Status: DISCONTINUED | OUTPATIENT
Start: 2022-05-12 | End: 2022-05-12 | Stop reason: HOSPADM

## 2022-05-12 RX ORDER — TRANEXAMIC ACID 10 MG/ML
1000 INJECTION, SOLUTION INTRAVENOUS ONCE
Status: COMPLETED | OUTPATIENT
Start: 2022-05-12 | End: 2022-05-12

## 2022-05-12 RX ORDER — OXYCODONE HYDROCHLORIDE 5 MG/1
10 TABLET ORAL EVERY 4 HOURS PRN
Status: DISCONTINUED | OUTPATIENT
Start: 2022-05-12 | End: 2022-05-16 | Stop reason: HOSPADM

## 2022-05-12 RX ORDER — FENTANYL CITRATE 50 UG/ML
INJECTION, SOLUTION INTRAMUSCULAR; INTRAVENOUS
Status: COMPLETED | OUTPATIENT
Start: 2022-05-12 | End: 2022-05-12

## 2022-05-12 RX ORDER — FAMOTIDINE 20 MG/1
20 TABLET, FILM COATED ORAL ONCE
Status: COMPLETED | OUTPATIENT
Start: 2022-05-12 | End: 2022-05-12

## 2022-05-12 RX ORDER — ONDANSETRON 4 MG/1
4 TABLET, FILM COATED ORAL EVERY 6 HOURS PRN
Status: DISCONTINUED | OUTPATIENT
Start: 2022-05-12 | End: 2022-05-16 | Stop reason: HOSPADM

## 2022-05-12 RX ORDER — DEXAMETHASONE SODIUM PHOSPHATE 10 MG/ML
INJECTION, SOLUTION INTRAMUSCULAR; INTRAVENOUS
Status: COMPLETED | OUTPATIENT
Start: 2022-05-12 | End: 2022-05-12

## 2022-05-12 RX ADMIN — TRANEXAMIC ACID 1000 MG: 10 INJECTION, SOLUTION INTRAVENOUS at 08:53

## 2022-05-12 RX ADMIN — BUPIVACAINE HYDROCHLORIDE 15 ML: 2.5 INJECTION, SOLUTION EPIDURAL; INFILTRATION; INTRACAUDAL at 07:00

## 2022-05-12 RX ADMIN — OXYCODONE 5 MG: 5 TABLET ORAL at 20:49

## 2022-05-12 RX ADMIN — HYDROMORPHONE HYDROCHLORIDE 0.5 MG: 1 INJECTION, SOLUTION INTRAMUSCULAR; INTRAVENOUS; SUBCUTANEOUS at 23:32

## 2022-05-12 RX ADMIN — ONDANSETRON 4 MG: 2 INJECTION INTRAMUSCULAR; INTRAVENOUS at 09:00

## 2022-05-12 RX ADMIN — ACETAMINOPHEN 1000 MG: 500 TABLET ORAL at 06:32

## 2022-05-12 RX ADMIN — BUPIVACAINE HYDROCHLORIDE 30 ML: 2.5 INJECTION, SOLUTION EPIDURAL; INFILTRATION; INTRACAUDAL at 07:10

## 2022-05-12 RX ADMIN — ROPIVACAINE HYDROCHLORIDE 6 ML/HR: 5 INJECTION, SOLUTION EPIDURAL; INFILTRATION; PERINEURAL at 09:19

## 2022-05-12 RX ADMIN — ROCURONIUM BROMIDE 50 MG: 10 INJECTION, SOLUTION INTRAVENOUS at 07:28

## 2022-05-12 RX ADMIN — SODIUM CHLORIDE 50 ML/HR: 4.5 INJECTION, SOLUTION INTRAVENOUS at 11:50

## 2022-05-12 RX ADMIN — CEFAZOLIN SODIUM 2 G: 2 INJECTION, SOLUTION INTRAVENOUS at 23:32

## 2022-05-12 RX ADMIN — DEXAMETHASONE SODIUM PHOSPHATE 2 MG: 10 INJECTION, SOLUTION INTRAMUSCULAR; INTRAVENOUS at 07:10

## 2022-05-12 RX ADMIN — DEXAMETHASONE SODIUM PHOSPHATE 4 MG: 4 INJECTION, SOLUTION INTRA-ARTICULAR; INTRALESIONAL; INTRAMUSCULAR; INTRAVENOUS; SOFT TISSUE at 07:31

## 2022-05-12 RX ADMIN — TRANEXAMIC ACID 1000 MG: 10 INJECTION, SOLUTION INTRAVENOUS at 07:37

## 2022-05-12 RX ADMIN — OXYCODONE 5 MG: 5 TABLET ORAL at 14:21

## 2022-05-12 RX ADMIN — FAMOTIDINE 20 MG: 20 TABLET ORAL at 06:32

## 2022-05-12 RX ADMIN — CEFAZOLIN SODIUM 2 G: 2 INJECTION, SOLUTION INTRAVENOUS at 07:22

## 2022-05-12 RX ADMIN — PROPOFOL 150 MG: 10 INJECTION, EMULSION INTRAVENOUS at 07:28

## 2022-05-12 RX ADMIN — LIDOCAINE HYDROCHLORIDE 50 MG: 10 INJECTION, SOLUTION INFILTRATION; PERINEURAL at 07:28

## 2022-05-12 RX ADMIN — PHENYLEPHRINE HYDROCHLORIDE 0.1 MCG/KG/MIN: 10 INJECTION INTRAVENOUS at 07:50

## 2022-05-12 RX ADMIN — FENTANYL CITRATE 100 MCG: 50 INJECTION, SOLUTION INTRAMUSCULAR; INTRAVENOUS at 07:00

## 2022-05-12 RX ADMIN — PREGABALIN 75 MG: 75 CAPSULE ORAL at 06:32

## 2022-05-12 RX ADMIN — LIDOCAINE HYDROCHLORIDE 0.5 ML: 10 INJECTION, SOLUTION EPIDURAL; INFILTRATION; INTRACAUDAL; PERINEURAL at 06:32

## 2022-05-12 RX ADMIN — CEFAZOLIN SODIUM 2 G: 2 INJECTION, SOLUTION INTRAVENOUS at 16:41

## 2022-05-12 RX ADMIN — SODIUM CHLORIDE, POTASSIUM CHLORIDE, SODIUM LACTATE AND CALCIUM CHLORIDE 9 ML/HR: 600; 310; 30; 20 INJECTION, SOLUTION INTRAVENOUS at 06:32

## 2022-05-12 NOTE — ANESTHESIA PROCEDURE NOTES
PECS      Patient reassessed immediately prior to procedure    Patient location during procedure: OR  Reason for block: at surgeon's request and post-op pain management  Performed by  CRNA/CAA: Thanh Sunshine, CRNA  Assisted by: Lori Kilgore RN  Preanesthetic Checklist  Completed: patient identified, IV checked, site marked, risks and benefits discussed, surgical consent, monitors and equipment checked, pre-op evaluation and timeout performed  Prep:  Pt Position: supine  Sterile barriers:cap, gloves, gown and mask  Prep: ChloraPrep  Patient monitoring: blood pressure monitoring, continuous pulse oximetry and EKG  Procedure    Sedation: no  Performed under: general  Guidance:ultrasound guided and landmark technique  Images:still images obtained, printed/placed on chart    Laterality:left  Block Type:PECS I and PECS II  Injection Technique:single-shot  Needle Type:short-bevel  Needle Gauge:20 G  Resistance on Injection: none    Medications Used: dexamethasone sodium phosphate injection, 2 mg  bupivacaine PF (MARCAINE) 0.25 % injection, 30 mL  Med administered at 5/12/2022 7:10 AM      Medications  Preservative Free Saline:10ml    Post Assessment  Injection Assessment: negative aspiration for heme and incremental injection  Patient Tolerance:comfortable throughout block  Complications:no  Additional Notes  The pt. Was placed in the Supine Position and GA was induced     The insertion site was prepped with CHG and Ultrasound guidance with In-Plane techniquewas  a 4inch BBraun 360 degree echogenic needle was visualized.  Normal Saline PSF was  utilized for hydrodissection of tissue. PECS 1 Block- Pectoralis Major and Minor where identified and LA was injected between PMM and PmM at the level of the 3rd Rib(10ml),  PECS 2-  Pectoralis Minor and Serratus muscle where identified and the needle was advanced laterally in-plane with the 4th rib as a backstop, pleura was monitored.  LA was injected between SA and PmM at  the level of 4th rib( 20ml).  LA injection spread was visualized, injection was incremental 1-5ml, normal or low injection pressure, no intravascular injection, no pneumothorax appreciated.  Thank You.

## 2022-05-12 NOTE — H&P
"Pre-Op H&P  Heidi Tomlinson  [id number]  1935    Chief complaint: \"Painful left shoulder\"    HPI:    Patient is a 86 y.o.female who presents with progressive pain and dysfunction of the left upper extremity.  She has known arthritis.  She is now considered failed conservative therapy.  Including, tincture of time, anti-inflammatories, occasional injections.  She is now admitted for surgical intervention.    Review of Systems:  General ROS: negative for chills, fever or skin lesions;  No changes since last office visit.  Neg for recent sick exposure  Cardiovascular ROS: no chest pain or dyspnea on exertion  Respiratory ROS: no cough, shortness of breath, or wheezing    Allergies:   Allergies   Allergen Reactions   • Codeine Nausea And Vomiting       Home Meds:    No current facility-administered medications on file prior to encounter.     Current Outpatient Medications on File Prior to Encounter   Medication Sig Dispense Refill   • diclofenac (VOLTAREN) 1 % gel gel Apply 4 g topically 2 (Two) Times a Day. 1 tube 5   • triamterene-hydrochlorothiazide (MAXZIDE-25) 37.5-25 MG per tablet Take 1 tablet by mouth Daily.     • [DISCONTINUED] potassium chloride (K-DUR,KLOR-CON) 20 MEQ CR tablet      • [DISCONTINUED] PROAIR RESPICLICK 108 (90 Base) MCG/ACT inhaler      • [DISCONTINUED] traZODone (DESYREL) 50 MG tablet          PMH:   Past Medical History:   Diagnosis Date   • Arthritis      PSH:    Past Surgical History:   Procedure Laterality Date   • CHOLECYSTECTOMY  1968   • COLONOSCOPY     • HIP ARTHROPLASTY Right 2011   • HYSTERECTOMY      partial   • KNEE ARTHROPLASTY Left    • TONSILLECTOMY  2004     Patient denies allergy to contrast dye or latex  Immunization History:  Influenza: Yes  Pneumococcal: Yes  Tetanus: Up-to-date    Social History:   Tobacco:   Social History     Tobacco Use   Smoking Status Never Smoker   Smokeless Tobacco Never Used      Alcohol:     Social History     Substance and Sexual " "Activity   Alcohol Use No       Vitals:           /69 (BP Location: Right arm, Patient Position: Lying)   Pulse 86   Temp 97.2 °F (36.2 °C) (Tympanic)   Resp 16   Ht 160 cm (63\")   Wt 62.6 kg (138 lb)   SpO2 97%   BMI 24.45 kg/m²     Physical Exam:  General Appearance:    Alert, cooperative, no distress, appears stated age   Head:    Normocephalic, without obvious abnormality, atraumatic   Lungs:    Clear to auscultation bilaterally to the bases    Heart:  S1-S2 without rubs murmurs or gallops    Abdomen:   Soft, nontender, bowel sounds present throughout   Breast Exam:    deferred   Genitalia:    deferred   Extremities:   Extremities normal, atraumatic, no cyanosis or edema   Skin:   Skin color, texture, turgor normal, no rashes or lesions   Neurologic:   Grossly intact   Results Review  LABS:  Lab Results   Component Value Date    WBC 5.81 05/10/2022    HGB 10.2 (L) 05/10/2022    HCT 32.9 (L) 05/10/2022    MCV 75.8 (L) 05/10/2022     05/10/2022    NEUTROABS 4.42 05/10/2022    GLUCOSE 106 (H) 05/10/2022    BUN 15 05/10/2022    CREATININE 0.55 (L) 05/10/2022     05/10/2022    K 3.5 05/10/2022    CL 97 (L) 05/10/2022    CO2 27.0 05/10/2022    CALCIUM 9.5 05/10/2022    ALBUMIN 4.10 05/10/2022    AST 20 05/10/2022    ALT 11 05/10/2022    BILITOT 0.3 05/10/2022    PTT 29.5 05/10/2022    INR 1.03 05/10/2022       RADIOLOGY:  Peripheral Block    Result Date: 5/12/2022  Tia Yao CRNA     5/12/2022  6:39 AM Peripheral Block Patient reassessed immediately prior to procedure Patient location during procedure: pre-op Reason for block: at surgeon's request and post-op pain management Preanesthetic Checklist Completed: patient identified, IV checked, site marked, risks and benefits discussed, surgical consent, monitors and equipment checked, pre-op evaluation and timeout performed Prep: Sterile barriers:cap, gloves, mask and sterile barriers Prep: ChloraPrep Patient monitoring: blood " pressure monitoring, continuous pulse oximetry and EKG Procedure Sedation: yes Performed under: local infiltration Guidance:ultrasound guided Images:still images obtained, printed/placed on chart Block Type:interscalene Injection Technique:catheter Needle Type:Tuohy and echogenic Needle Gauge:18 G Resistance on Injection: none Catheter Size:20 G (20g) Medications Used: fentaNYL citrate (PF) (SUBLIMAZE) injection, 100 mcg bupivacaine PF (MARCAINE) 0.25 % injection, 15 mL Post Assessment Injection Assessment: negative aspiration for heme, no paresthesia on injection and incremental injection Patient Tolerance:comfortable throughout block Complications:no Additional Notes Procedure:               The pt was placed in semifowlers position with a slight tilt of the thorax contralateral to the insertion site.  The Insertion Site was prepped and draped in sterile fashion.  The pt was anesthetized with  IV Sedation( see meds) and  Skin and cutaneous tissue was infiltrated and anesthetized with 1% Lidocaine 3 mls via a 25g needle.  Utilizing ultrasound guidance, a BBraun 4 inch 18 g Contiplex echogenic touhy needle was advanced in-plane.  Hydro dissection of tissue was achieved with Normal saline. Major vessels(carotid and Internal Jugular) where visualized as the brachial plexus was approached at the approximate level of C-7/ T-1.  Cervical 5 and Branches of Cervical 6 nerve roots where visualized and the needle tip was placed posterior at the level of C-6 roots.  LA spread was visualized and injection was made incrementally every 5 mls with aspiration. Injection pressure was normal or little, there was no intraneural injection, no vascular injection.    The BBraun 20 g wire stylet catheter was then placed under US guidance on the posterior aspect of the Brachial Plexus. The tuohy was removed and the location of catheter was confirmed with NS injection visualized with US . The skin was sealed with exofin tissue adhesive at  catheter insertion site.  Skin was prepped with benzoin and the catheter was secured with steristrips and a CHG tegaderm. Appropriate labels applied. Thank You.        I reviewed the patient's new clinical results.    Cancer Staging (if applicable)  Cancer Patient: __ yes __no __unknown; If yes, clinical stage T:__ N:__M:__, stage group or __N/A    Impression: Primary arthritis left shoulder  Microcytic anemia present on admission  Hypertension  Degenerative joint disease      Plan: Left total reverse arthroplasty, left      ANGELIQUE Pa   05/12/22   6:45 AM EDT

## 2022-05-12 NOTE — ANESTHESIA PROCEDURE NOTES
Peripheral Block      Patient reassessed immediately prior to procedure    Patient location during procedure: pre-op  Reason for block: at surgeon's request and post-op pain management  Performed by  CRNA/CAA: Tia Yao CRNA  Assisted by: Lori Kilgore RN  Preanesthetic Checklist  Completed: patient identified, IV checked, site marked, risks and benefits discussed, surgical consent, monitors and equipment checked, pre-op evaluation and timeout performed  Prep:  Pt Position: right lateral decubitus  Sterile barriers:cap, gloves, mask and sterile barriers  Prep: ChloraPrep  Patient monitoring: blood pressure monitoring, continuous pulse oximetry and EKG  Procedure    Sedation: yes  Performed under: local infiltration  Guidance:ultrasound guided  Images:still images obtained, printed/placed on chart    Laterality:left  Block Type:interscalene  Injection Technique:catheter  Needle Type:Tuohy and echogenic  Needle Gauge:18 G  Resistance on Injection: none  Catheter Size:20 G (20g)  Cath Depth at skin: 8 cm    Medications Used: fentaNYL citrate (PF) (SUBLIMAZE) injection, 100 mcg  bupivacaine PF (MARCAINE) 0.25 % injection, 15 mL  Med administered at 5/12/2022 7:00 AM      Medications  Preservative Free Saline:5ml    Post Assessment  Injection Assessment: negative aspiration for heme, no paresthesia on injection and incremental injection  Patient Tolerance:comfortable throughout block  Complications:no  Additional Notes  Procedure:                 The pt was placed in semifowlers position with a slight tilt of the thorax contralateral to the insertion site.  The Insertion Site was prepped and draped in sterile fashion.  The pt was anesthetized with  IV Sedation( see meds) and  Skin and cutaneous tissue was infiltrated and anesthetized with 1% Lidocaine 3 mls via a 25g needle.  Utilizing ultrasound guidance, a BBraun 4 inch 18 g Contiplex echogenic touhy needle was advanced in-plane.  Hydro dissection of  tissue was achieved with Normal saline. Major vessels(carotid and Internal Jugular) where visualized as the brachial plexus was approached at the approximate level of C-7/ T-1.  Cervical 5 and Branches of Cervical 6 nerve roots where visualized and the needle tip was placed posterior at the level of C-6 roots.  LA spread was visualized and injection was made incrementally every 5 mls with aspiration. Injection pressure was normal or little, there was no intraneural injection, no vascular injection.      The BBraun 20 g wire stylet catheter was then placed under US guidance on the posterior aspect of the Brachial Plexus. The tuohy was removed and the location of catheter was confirmed with NS injection visualized with US . The skin was sealed with exofin tissue adhesive at catheter insertion site.  Skin was prepped with benzoin and the catheter was secured with steristrips and a CHG tegaderm. Appropriate labels applied. Thank You.

## 2022-05-12 NOTE — ANESTHESIA PREPROCEDURE EVALUATION
Anesthesia Evaluation     Patient summary reviewed and Nursing notes reviewed                Airway   Mallampati: II  Dental      Pulmonary - negative pulmonary ROS   Cardiovascular - negative cardio ROS        Neuro/Psych- negative ROS  GI/Hepatic/Renal/Endo - negative ROS     Musculoskeletal (-) negative ROS    Abdominal    Substance History - negative use     OB/GYN negative ob/gyn ROS         Other                        Anesthesia Plan    ASA 3     general with block     intravenous induction     Anesthetic plan, all risks, benefits, and alternatives have been provided, discussed and informed consent has been obtained with: patient.        CODE STATUS:

## 2022-05-12 NOTE — H&P
Patient Name: Heidi Tomlinson  MRN: [id number]  : 1935  DOS: 2022    Attending: Mushtaq Walsh MD    Primary Care Provider: Alejo Negron MD      Chief complaint: Left shoulder pain    Subjective   Patient is a pleasant 86 y.o. female presented for scheduled surgery by Dr. Walsh.    She underwent left reverse total shoulder arthroplasty under GA and a block, tolerated surgery well, was admitted for further management.    Seen in her room afterwards, doing fairly well, no complains of nausea, vomiting, or shortness of breath.    Patient has lived independently to this point.  Her son lives at a section of her house and is available to help.        Allergies   Allergen Reactions   • Codeine Nausea And Vomiting       Meds:  Medications Prior to Admission   Medication Sig Dispense Refill Last Dose   • diclofenac (VOLTAREN) 1 % gel gel Apply 4 g topically 2 (Two) Times a Day. 1 tube 5 2022 at Unknown time   • potassium chloride (KLOR-CON) 20 MEQ packet Take 20 mEq by mouth Daily.   2022 at Unknown time   • traMADol (ULTRAM) 50 MG tablet Take 50 mg by mouth Every 8 (Eight) Hours As Needed for Moderate Pain .   2022 at Unknown time   • traZODone (DESYREL) 100 MG tablet Every 12 (Twelve) Hours.   Past Week at Unknown time   • triamterene-hydrochlorothiazide (MAXZIDE-25) 37.5-25 MG per tablet Take 1 tablet by mouth Daily.   2022 at Unknown time   • ondansetron (ZOFRAN) 4 MG tablet Take 1 tablet by mouth Every 8 (Eight) Hours As Needed post op nausea. 30 tablet 0        Past Medical History:   Diagnosis Date   • Arthritis      Past Surgical History:   Procedure Laterality Date   • CHOLECYSTECTOMY     • COLONOSCOPY     • HIP ARTHROPLASTY Right    • HYSTERECTOMY      partial   • KNEE ARTHROPLASTY Left    • TONSILLECTOMY       Family History   Problem Relation Age of Onset   • Diabetes Mother    • Osteoarthritis Father      Social History     Tobacco Use   • Smoking  "status: Never Smoker   • Smokeless tobacco: Never Used   Vaping Use   • Vaping Use: Never used   Substance Use Topics   • Alcohol use: No   • Drug use: No   .  Has 2 children.    Review of Systems  Pertinent items are noted in HPI, all other systems reviewed and negative    Vital Signs  /65 (BP Location: Right arm, Patient Position: Lying)   Pulse 70   Temp 97.3 °F (36.3 °C) (Temporal)   Resp 15   Ht 160 cm (63\")   Wt 62.6 kg (138 lb)   SpO2 96%   BMI 24.45 kg/m²     Physical Exam:    General Appearance:    Alert, cooperative, in no acute distress   Head:    Normocephalic, without obvious abnormality, atraumatic   Eyes:            Lids and lashes normal, conjunctivae and sclerae normal, no   icterus, no pallor, corneas clear,    Ears:    Ears appear intact with no abnormalities noted   Throat:   No oral lesions, no thrush, oral mucosa moist   Neck:   No adenopathy, supple, trachea midline, no thyromegaly         Lungs:     Clear to auscultation,respirations regular, even and                   unlabored    Heart:    Regular rhythm and normal rate, normal S1 and S2, no      murmur    Abdomen:     Normal bowel sounds, no masses, no organomegaly, soft        non-tender, non-distended, no guarding, no rebound                 tenderness   Genitalia:    Deferred   Extremities:  Left UE in a sling, CDI Aquacel dressing shoulder. Interscalene nerve block cath present.  Decreased movement of the hand and wrist on the effects of block.  Distal pulses, cap refill fingers,  intact.     Pulses:   Pulses palpable and equal bilaterally   Skin:   No bleeding, bruising or rash   Neurologic:   Cranial nerves 2 - 12 grossly intact      I reviewed the patient's new clinical results.       Results from last 7 days   Lab Units 05/10/22  0827   WBC 10*3/mm3 5.81   HEMOGLOBIN g/dL 10.2*   HEMATOCRIT % 32.9*   PLATELETS 10*3/mm3 407     Results from last 7 days   Lab Units 05/10/22  0827   SODIUM mmol/L 136   POTASSIUM " mmol/L 3.5   CHLORIDE mmol/L 97*   CO2 mmol/L 27.0   BUN mg/dL 15   CREATININE mg/dL 0.55*   CALCIUM mg/dL 9.5   BILIRUBIN mg/dL 0.3   ALK PHOS U/L 95   ALT (SGPT) U/L 11   AST (SGOT) U/L 20   GLUCOSE mg/dL 106*     Lab Results   Component Value Date    HGBA1C 5.40 05/10/2022           Assessment and Plan:       S/P reverse total shoulder arthroplasty, left    Glenohumeral arthritis, left      Plan    1. PT/OT. NWB, left UE, ROM hand, wrist, elbow.  2. Pain control-prns, interscalene nerve block cath with ropivacaine infusion.   3. IS-encourage  4. DVT proph- Mech/ mobilize.  5. Bowel regimen  6. Resume home medications as appropriate  7. Monitor post-op labs  8. DC planning for home    I discussed with patient and her daughter.    Aida disclaimer:  Part of this encounter note is an electronic transcription/translation of spoken language to printed text. The electronic translation of spoken language may permit erroneous, or at times, nonsensical words or phrases to be inadvertently transcribed; Although I have reviewed the note for such errors, some may still exist.    Vicki Scott MD  05/12/22  12:39 EDT

## 2022-05-12 NOTE — THERAPY EVALUATION
"Patient Name: Heidi Tomlinson  : 1935    MRN: [id number]                              Today's Date: 2022       Admit Date: 2022    Visit Dx: No diagnosis found.  Patient Active Problem List   Diagnosis   • Glenohumeral arthritis, left   • S/P reverse total shoulder arthroplasty, left     Past Medical History:   Diagnosis Date   • Arthritis      Past Surgical History:   Procedure Laterality Date   • CHOLECYSTECTOMY     • COLONOSCOPY     • HIP ARTHROPLASTY Right    • HYSTERECTOMY      partial   • KNEE ARTHROPLASTY Left    • TONSILLECTOMY        General Information     Row Name 22 1356          OT Time and Intention    Document Type evaluation  -HK     Mode of Treatment occupational therapy  -     Row Name 22 1356          General Information    Patient Profile Reviewed yes  -HK     Prior Level of Function all household mobility;community mobility;gait;transfer;ADL's;bed mobility;min assist:  use of SPC at baseline  -HK     Existing Precautions/Restrictions fall;non-weight bearing;left;shoulder  -HK     Barriers to Rehab medically complex;previous functional deficit  -     Row Name 22 1355          Occupational Profile    Environmental Supports and Barriers (Occupational Profile) Of note pt does not have assist at home. Son lives in the home however per pt \"Lives in his own quarters\". Daughter lives 20 miles away and works daily. Grand daughter can assist at times.   -     Row Name 22 1356          Living Environment    People in Home child(davey), adult;other (see comments)  Reports her son lives in her home however \"In his own quarters\" and will be unable to assist her. Daughter and grand daughter will assist however live 20 miles away.  -     Row Name 22 1352          Cognition    Orientation Status (Cognition) oriented x 4  -     Row Name 22 1359          Safety Issues, Functional Mobility    Safety Issues Affecting Function (Mobility) " safety precautions follow-through/compliance;safety precaution awareness;awareness of need for assistance;insight into deficits/self-awareness;judgment;problem-solving;sequencing abilities  -     Impairments Affecting Function (Mobility) balance;pain;strength;range of motion (ROM)  -           User Key  (r) = Recorded By, (t) = Taken By, (c) = Cosigned By    Initials Name Provider Type     Huong Luna OT Occupational Therapist                 Mobility/ADL's     Row Name 05/12/22 1402          Bed Mobility    Bed Mobility scooting/bridging;supine-sit  -HK     Scooting/Bridging [place] (Bed Mobility) minimum assist (75% patient effort);1 person assist;verbal cues  -     Supine-Sit [place] (Bed Mobility) minimum assist (75% patient effort);1 person assist;verbal cues  -HK     Bed Mobility, Safety Issues decreased use of arms for pushing/pulling;decreased use of legs for bridging/pushing;impaired trunk control for bed mobility  -     Assistive Device (Bed Mobility) bed rails;head of bed elevated  -     Comment, (Bed Mobility) minAx1 to advance to EOB. Pt educated on safe completion of bed mobility while maintaining shoulder precautions.  -     Row Name 05/12/22 1402          Transfers    Transfers sit-stand transfer;toilet transfer  -     Comment, (Transfers) Pt reports knee pain causing difficulty with ambulation at baseline.  -     Sit-Stand [place] (Transfers) minimum assist (75% patient effort);1 person assist;verbal cues  -     [place] Level (Toilet Transfer) minimum assist (75% patient effort);1 person assist;verbal cues  -     Assistive Device (Toilet Transfer) raised toilet seat;other (see comments)  R UE support  -     Row Name 05/12/22 1402          Sit-Stand Transfer    Assistive Device (Sit-Stand Transfers) other (see comments)  R UE support  -     Row Name 05/12/22 1402          Toilet Transfer    Type (Toilet Transfer) sit-stand;stand-sit  -     Row Name  05/12/22 1402          Functional Mobility    Functional Mobility- Ind. Level minimum assist (75% patient effort);verbal cues required  -HK     Functional Mobility- Device walker, front-wheeled  -HK     Functional Mobility- Comment Pt ambulated from bed to bathroom and back to bed with minAx1. Noted LOB during ambulation and decreased step length. Pt uses SPC at baseline and will need PT eval. Did not pass mobility screen.  -     Row Name 05/12/22 1402          Activities of Daily Living    BADL Assessment/Intervention lower body dressing;upper body dressing;toileting;bathing  -     Row Name 05/12/22 1402          Mobility    Extremity Weight-bearing Status left upper extremity  -HK     Left Upper Extremity (Weight-bearing Status) non weight-bearing (NWB)   -     Row Name 05/12/22 1402          Lower Body Dressing Assessment/Training    [place] Level (Lower Body Dressing) don;other (see comments);maximum assist (25% patient effort)  underwear  -HK     Position (Lower Body Dressing) unsupported sitting  -     Row Name 05/12/22 1402          Upper Body Dressing Assessment/Training    [place] Level (Upper Body Dressing) doff;don;other (see comments);maximum assist (25% patient effort)  sling  -HK     Position (Upper Body Dressing) unsupported sitting  -HK     Comment, (Upper Body Dressing) Pt and daughter educate don sling management as well as wear and care, shoulder precautions, axilla care, katharine dressing and care of nerve catheter during ADLS. Daughter did not complete teach back this date however did assist to don/dof straps.  -     Row Name 05/12/22 1402          Toileting Assessment/Training    [place] Level (Toileting) adjust/manage clothing;perform perineal hygiene;minimum assist (75% patient effort)  -HK     Position (Toileting) unsupported sitting;supported standing  -     Comment, (Toileting) Mika to retrieve toilet paper and pull underwear up.  -     Row Name 05/12/22 1402           Bathing Assessment/Intervention    Comment, (Bathing) Pt and daugther educated on sling management as well as wear and care, shoulder precautions, axilla care, katharine dressing and care of nerve catheter during ADLS.  -           User Key  (r) = Recorded By, (t) = Taken By, (c) = Cosigned By    Initials Name Provider Type     Huong Luna, OT Occupational Therapist               Obj/Interventions     Row Name 05/12/22 1423          Sensory Assessment (Somatosensory)    Sensory Assessment (Somatosensory) left UE  -     Left UE Sensory Assessment impaired  -     Sensory Subjective Reports insensate  -     Row Name 05/12/22 1423          Vision Assessment/Intervention    Visual Impairment/Limitations WFL  -[facility] Name 05/12/22 1423          Range of Motion Comprehensive    General Range of Motion no range of motion deficits identified  -     Comment, General Range of Motion L UE in sling; R UE WFL for eval  -[facility] Name 05/12/22 1423          Strength Comprehensive (MMT)    Comment, General Manual Muscle Testing (MMT) Assessment L UE in sling; R UE WFL for eval  -HK     Row Name 05/12/22 1423          Elbow/Forearm (Therapeutic Exercise)    Elbow/Forearm (Therapeutic Exercise) PROM (passive range of motion)  -     Elbow/Forearm PROM (Therapeutic Exercise) left;flexion;extension;supination;pronation;10 repetitions  -     Row Name 05/12/22 1423          Wrist (Therapeutic Exercise)    Wrist (Therapeutic Exercise) PROM (passive range of motion)  -     Wrist PROM (Therapeutic Exercise) left;flexion;extension;10 repetitions  -[facility] Name 05/12/22 1423          Hand (Therapeutic Exercise)    Hand (Therapeutic Exercise) PROM (passive range of motion)  -     Hand PROM (Therapeutic Exercise) left;finger flexion;finger extension;10 repetitions  -     Row Name 05/12/22 1423          Motor Skills    Therapeutic Exercise elbow/forearm;wrist;hand  -[facility] Name 05/12/22 1423          Balance     Balance Assessment sitting static balance;sitting dynamic balance;standing static balance;standing dynamic balance  -     Static Sitting Balance supervision  -     Dynamic Sitting Balance supervision  -HK     Position, Sitting Balance unsupported;sitting edge of bed  -HK     Static Standing Balance contact guard;verbal cues  -HK     Dynamic Standing Balance minimal assist;verbal cues  -HK     Position/Device Used, Standing Balance supported  R UE support  -HK     Balance Interventions sitting;occupation based/functional task  -HK           User Key  (r) = Recorded By, (t) = Taken By, (c) = Cosigned By    Initials Name Provider Type    HK Huong Luna, OT Occupational Therapist               Goals/Plan     Row Name 05/12/22 1430          Bed Mobility Goal 1 (OT)    Activity/Assistive Device (Bed Mobility Goal 1, OT) sit to supine/supine to sit;scooting  -HK     [place] Level/Cues Needed (Bed Mobility Goal 1, OT) contact guard required;verbal cues required  -HK     Time Frame (Bed Mobility Goal 1, OT) by discharge;long term goal (LTG)  -HK     Progress/Outcomes (Bed Mobility Goal 1, OT) goal ongoing  -     Row Name 05/12/22 1430          Transfer Goal 1 (OT)    Activity/Assistive Device (Transfer Goal 1, OT) sit-to-stand/stand-to-sit  -HK     [place] Level/Cues Needed (Transfer Goal 1, OT) contact guard required;verbal cues required  -HK     Time Frame (Transfer Goal 1, OT) by discharge;long term goal (LTG)  -HK     Progress/Outcome (Transfer Goal 1, OT) goal ongoing  -     Row Name 05/12/22 1430          Dressing Goal 1 (OT)    Activity/Device (Dressing Goal 1, OT) upper body dressing;other (see comments)  Daughter will be able to dof/don sling  -HK     [place]/Cues Needed (Dressing Goal 1, OT) supervision required  -HK     Time Frame (Dressing Goal 1, OT) by discharge;long term goal (LTG)  -HK     Progress/Outcome (Dressing Goal 1, OT) goal ongoing  -     Row Name 05/12/22 1430          ROM  Goal 1 (OT)    ROM Goal 1 (OT) Pt and daughter will be able to adequately demosntrate L UE HEP per surgeons preference.  -HK     Time Frame (ROM Goal 1, OT) by discharge;long term goal (LTG)  -HK     Progress/Outcome (ROM Goal 1, OT) goal ongoing  -HK     Row Name 05/12/22 1430          Therapy Assessment/Plan (OT)    Planned Therapy Interventions (OT) adaptive equipment training;BADL retraining;functional balance retraining;occupation/activity based interventions;ROM/therapeutic exercise;transfer/mobility retraining;orthotic fabrication/fitting/training  -HK           User Key  (r) = Recorded By, (t) = Taken By, (c) = Cosigned By    Initials Name Provider Type    HK Huong Luna, OT Occupational Therapist               Clinical Impression     Row Name 05/12/22 1424          Pain Assessment    Pretreatment Pain Rating 0/10 - no pain  -HK     Posttreatment Pain Rating 0/10 - no pain  -HK     Row Name 05/12/22 1424          Plan of Care Review    Plan of Care Reviewed With patient  -HK     Progress improving  -HK     Outcome Evaluation OT eval complete. Pt and daughter educated on sling management as well as wear and care, shoulder precautions, axilla care, katharine dressing and care of nerve catheter during ADLS. Daughter did not complete teach back however assisted to dof/don straps this date. Will need follow up training. Of concern pt lives in home with her son who will not be able to assist her at home. Daughter lives 20 miles away and works full time. Pt will be alone most of the day. Of note pt unable to move fingers on L hand during session and PROM of elbow/wrist/hand completed x10 reps. Pt requires Mika for all bed mobility, transfers, and functional mobility with R UE support. Pt did not pass mobility screen and will need PT eval. Unable to assess oxygen during session. RN aware. At this time due to increased assist with mobility, decreased indepencene with ADLS, and no assist at home this OT strongly  recommends d/c to [facility].  -     Row Name 05/12/22 1424          Therapy Assessment/Plan (OT)    Patient/Family Therapy Goal Statement (OT) Pt would like to improve and return home.  -     Rehab Potential (OT) good, to achieve stated therapy goals  -     Criteria for Skilled Therapeutic Interventions Met (OT) yes;skilled treatment is necessary  -     Therapy Frequency (OT) daily  -     Row Name 05/12/22 1424          Therapy Plan Review/Discharge Plan (OT)    Anticipated Discharge Disposition (OT) inpatient rehabilitation facility  -     Row Name 05/12/22 1424          Vital Signs    Pre Systolic BP Rehab --  RN cleared for tx; VSS  -HK     Pre SpO2 (%) --  no sensor cord in room; Charge RN made aware  -HK     O2 Delivery Pre Treatment room air  -HK     O2 Delivery Intra Treatment room air  -HK     O2 Delivery Post Treatment room air  -HK     Pre Patient Position Supine  -HK     Intra Patient Position Standing  -HK     Post Patient Position Sitting  -     Row Name 05/12/22 1424          Positioning and Restraints    Pre-Treatment Position in bed  -HK     Post Treatment Position chair  -HK     In Chair notified nsg;reclined;encouraged to call for assist;call light within reach;exit alarm on;with family/caregiver  -           User Key  (r) = Recorded By, (t) = Taken By, (c) = Cosigned By    Initials Name Provider Type    HK Huong Luna, OT Occupational Therapist               Outcome Measures     Row Name 05/12/22 1431          How much help from another is currently needed...    Putting on and taking off regular lower body clothing? 2  -HK     Bathing (including washing, rinsing, and drying) 2  -HK     Toileting (which includes using toilet bed pan or urinal) 2  -HK     Putting on and taking off regular upper body clothing 2  -HK     Taking care of personal grooming (such as brushing teeth) 3  -HK     Eating meals 3  -HK     AM-PAC 6 Clicks Score (OT) 14  -     Row Name 05/12/22 1431           Functional Assessment    Outcome Measure Options AM-PAC 6 Clicks Daily Activity (OT)  -           User Key  (r) = Recorded By, (t) = Taken By, (c) = Cosigned By    Initials Name Provider Type     Huong Luna OT Occupational Therapist                Occupational Therapy Education                 Title: PT OT SLP Therapies (Done)     Topic: Occupational Therapy (Done)     Point: ADL training (Done)     Description:   Instruct learner(s) on proper safety adaptation and remediation techniques during self care or transfers.   Instruct in proper use of assistive devices.              Learning Progress Summary           Patient Acceptance, E,TB,D, VU,NR by  at 5/12/2022 1432                   Point: Home exercise program (Done)     Description:   Instruct learner(s) on appropriate technique for monitoring, assisting and/or progressing therapeutic exercises/activities.              Learning Progress Summary           Patient Acceptance, E,TB,D, VU,NR by  at 5/12/2022 1432                   Point: Precautions (Done)     Description:   Instruct learner(s) on prescribed precautions during self-care and functional transfers.              Learning Progress Summary           Patient Acceptance, E,TB,D, VU,NR by  at 5/12/2022 1432                   Point: Body mechanics (Done)     Description:   Instruct learner(s) on proper positioning and spine alignment during self-care, functional mobility activities and/or exercises.              Learning Progress Summary           Patient Acceptance, E,TB,D, VU,NR by  at 5/12/2022 1432                               User Key     Initials Effective Dates Name Provider Type [facility] 06/16/21 -  Huong Luan OT Occupational Therapist OT              OT Recommendation and Plan  Planned Therapy Interventions (OT): adaptive equipment training, BADL retraining, functional balance retraining, occupation/activity based interventions, ROM/therapeutic exercise, transfer/mobility  retraining, orthotic fabrication/fitting/training  Therapy Frequency (OT): daily  Plan of Care Review  Plan of Care Reviewed With: patient  Progress: improving  Outcome Evaluation: OT eval complete. Pt and daughter educated on sling management as well as wear and care, shoulder precautions, axilla care, katharine dressing and care of nerve catheter during ADLS. Daughter did not complete teach back however assisted to dof/don straps this date. Will need follow up training. Of concern pt lives in home with her son who will not be able to assist her at home. Daughter lives 20 miles away and works full time. Pt will be alone most of the day. Of note pt unable to move fingers on L hand during session and PROM of elbow/wrist/hand completed x10 reps. Pt requires Mika for all bed mobility, transfers, and functional mobility with R UE support. Pt did not pass mobility screen and will need PT eval. Unable to assess oxygen during session. RN aware. At this time due to increased assist with mobility, decreased indepencene with ADLS, and no assist at home this OT strongly recommends d/c to [facility].     Time Calculation:    Time Calculation- OT     Row Name 05/12/22 1310             Time Calculation- OT    OT Start Time 1310  -HK      OT Received On 05/12/22  -      OT Goal Re-Cert Due Date 05/22/22  -HK              Timed Charges    87185 - OT Therapeutic Exercise Minutes 13  -HK      80603 - OT Self Care/Mgmt Minutes 15  -HK              Untimed Charges    OT Eval/Re-eval Minutes 50  -HK              Total Minutes    Timed Charges Total Minutes 28  -HK      Untimed Charges Total Minutes 50  -HK       Total Minutes 78  -HK            User Key  (r) = Recorded By, (t) = Taken By, (c) = Cosigned By    Initials Name Provider Type    Huong Brush, OT Occupational Therapist              Therapy Charges for Today     Code Description Service Date Service Provider Modifiers Qty    97085934679  OT SELF CARE/MGMT/TRAIN EA 15 MIN 5/12/2022  Huong Luna, OT GO 1    64756172361 HC OT THER PROC EA 15 MIN 5/12/2022 Huong Luna, OT GO 1    80717279422 HC OT EVAL MOD COMPLEXITY 4 5/12/2022 Huong Luna, OT GO 1               Huong Luna OT  5/12/2022

## 2022-05-12 NOTE — ANESTHESIA PROCEDURE NOTES
Airway  Urgency: elective    Date/Time: 5/12/2022 7:42 AM  Airway not difficult    General Information and Staff    Patient location during procedure: OR  CRNA/CAA: Tia Yao CRNA    Indications and Patient Condition  Indications for airway management: airway protection    Preoxygenated: yes  MILS not maintained throughout  Mask difficulty assessment: 1 - vent by mask    Final Airway Details  Final airway type: endotracheal airway      Successful airway: ETT  Cuffed: yes   Successful intubation technique: direct laryngoscopy  Endotracheal tube insertion site: oral  Blade: Dane  Blade size: 3  ETT size (mm): 7.0  Cormack-Lehane Classification: grade I - full view of glottis  Placement verified by: chest auscultation and capnometry   Measured from: lips  ETT/EBT  to lips (cm): 20  Number of attempts at approach: 1  Assessment: lips, teeth, and gum same as pre-op and atraumatic intubation    Additional Comments  Negative epigastric sounds, Breath sound equal bilaterally with symmetric chest rise and fall

## 2022-05-12 NOTE — PLAN OF CARE
Goal Outcome Evaluation:  Plan of Care Reviewed With: patient        Progress: improving  Outcome Evaluation: OT eval complete. Pt and daughter educated on sling management as well as wear and care, shoulder precautions, axilla care, katharine dressing and care of nerve catheter during ADLS. Daughter did not complete teach back however assisted to dof/don straps this date. Will need follow up training. Of concern pt lives in home with her son who will not be able to assist her at home. Daughter lives 20 miles away and works full time. Pt will be alone most of the day. Of note pt unable to move fingers on L hand during session and PROM of elbow/wrist/hand completed x10 reps. Pt requires Mika for all bed mobility, transfers, and functional mobility with R UE support. Pt did not pass mobility screen and will need PT eval. Unable to assess oxygen during session. RN aware. At this time due to increased assist with mobility, decreased indepencene with ADLS, and no assist at home this OT strongly recommends d/c to [facility].

## 2022-05-12 NOTE — OP NOTE
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 same     PROCEDURES PERFORMED:  1. Left reverse total shoulder arthroplasty with repair of the subscapularis.    2. Left biceps tenotomy.       SURGEON: Mushtaq Walsh MD        Assistant: Gely Andrade PA-C  ** Please note the physician assistant was medically necessary to assist with positioning retraction, arm positioning, care of soft tissues and closure      ANESTHESIA: General plus block.       ESTIMATED BLOOD LOSS:100mL.       COMPLICATIONS: None.       DISPOSITION: Recovery room in stable condition.      IMPLANTS:  Tornier reverse total shoulder system  Humeral Stem: size 2+ long  Polyethylene Liner: 36+3 10 deg medial lip  Baseplate: 25mm, short post, full wedge  Glenosphere: 36 std      INDICATIONS: This is a 86-year-old female with left shoulder pain and limited function and motion secondary to above diagnosis. They have failed conservative treatment and after a discussion of risks, benefits, and alternatives, wished to proceed with shoulder arthroplasty.     DESCRIPTION OF PROCEDURE: On the day of surgery, the patient identified the left shoulder as the correct operative extremity. This was initialed by the surgeon with the patients's acknowledgment. The patient underwent placement of an interscalene block and was taken to the operating room and placed in the supine position. Upon induction of adequate anesthesia, the patient was brought up to the beach chair position and the shoulder and upper extremity were prepped and draped in the usual sterile fashion. Timeout confirmed the correct patient and operative extremity as well as that antibiotics were on board. A standard deltopectoral approach to the shoulder was carried out. It was carried sharply through the skin and subcutaneous tissue.  Medial and lateral flaps were developed over the deltopectoral fascia. The cephalic vein was identified and mobilized laterally with the deltoid. The subdeltoid and subpectoral spaces were mobilized and a blunt retractor was placed deep to this. The clavipectoral fascia was opened on the lateral edge of the conjoined tendon and the retractor was moved deep to this. The leading edge of the pectoralis was released exposing the long head of the biceps. This was tenosynovitic and therefore tenotomized. The 3 sisters were identified and coagulated. A subscapularis tenotomy was performed and rotator interval was released to the glenoid exposing the humeral head. The inferior capsule was released directly off the humerus to allow greater than 90° of external rotation. The anatomic neck was exposed and the humeral head osteotomy was performed in approximately 30° of retroversion. The remainder of the osteophytes were removed. The canal was then entered, reamed, and broached. The final stem impacted in in approximately 30° of retroversion. A head protector was placed. The humerus was subluxed posteriorly. The glenoid exposed. Circumferential labral excision and capsular release were performed. A  mobilization of the subscapularis was carried out as well.  A centering hole was drilled. The glenoid was gently reamed and then the  central hole for the baseplate was drilled  glenoid baseplate inserted.  Screws were then placed through the baseplate  The glenosphere was then inserted and locked into place with a set screw.  The humerus was carefully subluxed back anteriorly. A liner tray and polyethylene were placed and trialing was carried out. The appropriate final sizes were chosen and locked into place.  The shoulder was then reduced. 3 #2 fiberwire sutures where then placed through bone tunnels and the subscapularis was repaired anatomically. This allowed nearly full passive range of motion with no instability. The joint was  copiously irrigated with orthopedic irrigation after the final implants were assembled and locked into place.      vancomycin powder was placed in the wound       The deltopectoral interval was approximated with 0 Vicryl, the subcutaneous tissue with 2-0 Vicryl, and the skin with nylon. A sterile dressing was placed. Anesthesia was reversed and the patient was taken to the recovery room in stable condition. All instrument, needle, and sponge counts were correct.       Mushtaq Walsh MD, MS

## 2022-05-12 NOTE — ANESTHESIA POSTPROCEDURE EVALUATION
Patient: Heidi Tomlinson    Procedure Summary     Date: 05/12/22 Room / Location:  [facility]  /  [facility]    Anesthesia Start: 0722 Anesthesia Stop: 0946    Procedure: TOTAL SHOULDER REVERSE ARTHROPLASTY LEFT (Left Shoulder) Diagnosis:       Glenohumeral arthritis, left      (left glenohumeral arthritis)    Surgeons: Mushtaq Walsh MD Provider: Kit Tavarez MD    Anesthesia Type: general with block ASA Status: 3          Anesthesia Type: general with block    Vitals  Vitals Value Taken Time   BP     Temp     Pulse     Resp     SpO2 98 % 05/12/22 0945   Vitals shown include unvalidated device data.        Post Anesthesia Care and Evaluation    Patient location during evaluation: PACU  Patient participation: complete - patient participated  Level of consciousness: awake and alert  Pain score: 0  Pain management: adequate  Airway patency: patent  Anesthetic complications: No anesthetic complications  PONV Status: none  Cardiovascular status: hemodynamically stable and acceptable  Respiratory status: nonlabored ventilation, acceptable and nasal cannula  Hydration status: acceptable

## 2022-05-13 ENCOUNTER — ANESTHESIA EVENT CONVERTED (OUTPATIENT)
Dept: ANESTHESIOLOGY | Facility: HOSPITAL | Age: 87
End: 2022-05-13

## 2022-05-13 ENCOUNTER — ANESTHESIA EVENT (OUTPATIENT)
Dept: TELEMETRY | Facility: HOSPITAL | Age: 87
End: 2022-05-13

## 2022-05-13 ENCOUNTER — ANESTHESIA (OUTPATIENT)
Dept: TELEMETRY | Facility: HOSPITAL | Age: 87
End: 2022-05-13

## 2022-05-13 PROBLEM — E87.6 HYPOKALEMIA: Status: ACTIVE | Noted: 2022-05-13

## 2022-05-13 LAB
ANION GAP SERPL CALCULATED.3IONS-SCNC: 9 MMOL/L (ref 5–15)
BASOPHILS # BLD AUTO: 0 10*3/MM3 (ref 0–0.2)
BASOPHILS NFR BLD AUTO: 0 % (ref 0–1.5)
BUN SERPL-MCNC: 8 MG/DL (ref 8–23)
BUN/CREAT SERPL: 17.4 (ref 7–25)
CALCIUM SPEC-SCNC: 8.4 MG/DL (ref 8.6–10.5)
CHLORIDE SERPL-SCNC: 101 MMOL/L (ref 98–107)
CO2 SERPL-SCNC: 26 MMOL/L (ref 22–29)
CREAT SERPL-MCNC: 0.46 MG/DL (ref 0.57–1)
DEPRECATED RDW RBC AUTO: 42.7 FL (ref 37–54)
EGFRCR SERPLBLD CKD-EPI 2021: 94.5 ML/MIN/1.73
EOSINOPHIL # BLD AUTO: 0.01 10*3/MM3 (ref 0–0.4)
EOSINOPHIL NFR BLD AUTO: 0.1 % (ref 0.3–6.2)
ERYTHROCYTE [DISTWIDTH] IN BLOOD BY AUTOMATED COUNT: 16.5 % (ref 12.3–15.4)
GLUCOSE SERPL-MCNC: 113 MG/DL (ref 65–99)
HCT VFR BLD AUTO: 24.4 % (ref 34–46.6)
HGB BLD-MCNC: 7.9 G/DL (ref 12–15.9)
IMM GRANULOCYTES # BLD AUTO: 0.05 10*3/MM3 (ref 0–0.05)
IMM GRANULOCYTES NFR BLD AUTO: 0.7 % (ref 0–0.5)
LYMPHOCYTES # BLD AUTO: 1.19 10*3/MM3 (ref 0.7–3.1)
LYMPHOCYTES NFR BLD AUTO: 15.5 % (ref 19.6–45.3)
MAGNESIUM SERPL-MCNC: 1.7 MG/DL (ref 1.6–2.4)
MCH RBC QN AUTO: 23.5 PG (ref 26.6–33)
MCHC RBC AUTO-ENTMCNC: 32.4 G/DL (ref 31.5–35.7)
MCV RBC AUTO: 72.6 FL (ref 79–97)
MONOCYTES # BLD AUTO: 0.62 10*3/MM3 (ref 0.1–0.9)
MONOCYTES NFR BLD AUTO: 8.1 % (ref 5–12)
NEUTROPHILS NFR BLD AUTO: 5.81 10*3/MM3 (ref 1.7–7)
NEUTROPHILS NFR BLD AUTO: 75.6 % (ref 42.7–76)
NRBC BLD AUTO-RTO: 0 /100 WBC (ref 0–0.2)
PLATELET # BLD AUTO: 328 10*3/MM3 (ref 140–450)
PMV BLD AUTO: 8.5 FL (ref 6–12)
POTASSIUM SERPL-SCNC: 3.2 MMOL/L (ref 3.5–5.2)
RBC # BLD AUTO: 3.36 10*6/MM3 (ref 3.77–5.28)
SODIUM SERPL-SCNC: 136 MMOL/L (ref 136–145)
WBC NRBC COR # BLD: 7.68 10*3/MM3 (ref 3.4–10.8)

## 2022-05-13 PROCEDURE — A9270 NON-COVERED ITEM OR SERVICE: HCPCS | Performed by: INTERNAL MEDICINE

## 2022-05-13 PROCEDURE — 25010000002 HYDROMORPHONE PER 4 MG: Performed by: ORTHOPAEDIC SURGERY

## 2022-05-13 PROCEDURE — 85025 COMPLETE CBC W/AUTO DIFF WBC: CPT | Performed by: ORTHOPAEDIC SURGERY

## 2022-05-13 PROCEDURE — 97116 GAIT TRAINING THERAPY: CPT

## 2022-05-13 PROCEDURE — 83735 ASSAY OF MAGNESIUM: CPT | Performed by: INTERNAL MEDICINE

## 2022-05-13 PROCEDURE — 97161 PT EVAL LOW COMPLEX 20 MIN: CPT

## 2022-05-13 PROCEDURE — A9270 NON-COVERED ITEM OR SERVICE: HCPCS | Performed by: ORTHOPAEDIC SURGERY

## 2022-05-13 PROCEDURE — 63710000001 OXYCODONE 5 MG TABLET: Performed by: ORTHOPAEDIC SURGERY

## 2022-05-13 PROCEDURE — 63710000001 POTASSIUM CHLORIDE 10 MEQ CAPSULE CONTROLLED-RELEASE: Performed by: INTERNAL MEDICINE

## 2022-05-13 PROCEDURE — 80048 BASIC METABOLIC PNL TOTAL CA: CPT | Performed by: ORTHOPAEDIC SURGERY

## 2022-05-13 PROCEDURE — 97110 THERAPEUTIC EXERCISES: CPT

## 2022-05-13 PROCEDURE — 97535 SELF CARE MNGMENT TRAINING: CPT

## 2022-05-13 RX ORDER — MAGNESIUM SULFATE HEPTAHYDRATE 40 MG/ML
2 INJECTION, SOLUTION INTRAVENOUS AS NEEDED
Status: DISCONTINUED | OUTPATIENT
Start: 2022-05-13 | End: 2022-05-16 | Stop reason: HOSPADM

## 2022-05-13 RX ORDER — MAGNESIUM SULFATE HEPTAHYDRATE 40 MG/ML
4 INJECTION, SOLUTION INTRAVENOUS AS NEEDED
Status: DISCONTINUED | OUTPATIENT
Start: 2022-05-13 | End: 2022-05-16 | Stop reason: HOSPADM

## 2022-05-13 RX ORDER — POTASSIUM CHLORIDE 1.5 G/1.77G
40 POWDER, FOR SOLUTION ORAL AS NEEDED
Status: DISCONTINUED | OUTPATIENT
Start: 2022-05-13 | End: 2022-05-16 | Stop reason: HOSPADM

## 2022-05-13 RX ORDER — BUPIVACAINE HYDROCHLORIDE 2.5 MG/ML
INJECTION, SOLUTION EPIDURAL; INFILTRATION; INTRACAUDAL
Status: COMPLETED | OUTPATIENT
Start: 2022-05-13 | End: 2022-05-13

## 2022-05-13 RX ORDER — POTASSIUM CHLORIDE 750 MG/1
40 CAPSULE, EXTENDED RELEASE ORAL AS NEEDED
Status: DISCONTINUED | OUTPATIENT
Start: 2022-05-13 | End: 2022-05-16 | Stop reason: HOSPADM

## 2022-05-13 RX ORDER — POTASSIUM CHLORIDE 750 MG/1
40 CAPSULE, EXTENDED RELEASE ORAL ONCE
Status: COMPLETED | OUTPATIENT
Start: 2022-05-13 | End: 2022-05-13

## 2022-05-13 RX ADMIN — SODIUM CHLORIDE 50 ML/HR: 4.5 INJECTION, SOLUTION INTRAVENOUS at 09:17

## 2022-05-13 RX ADMIN — OXYCODONE 5 MG: 5 TABLET ORAL at 12:26

## 2022-05-13 RX ADMIN — OXYCODONE 10 MG: 5 TABLET ORAL at 16:23

## 2022-05-13 RX ADMIN — HYDROMORPHONE HYDROCHLORIDE 0.5 MG: 1 INJECTION, SOLUTION INTRAMUSCULAR; INTRAVENOUS; SUBCUTANEOUS at 19:41

## 2022-05-13 RX ADMIN — POTASSIUM CHLORIDE 40 MEQ: 750 CAPSULE, EXTENDED RELEASE ORAL at 19:41

## 2022-05-13 RX ADMIN — POTASSIUM CHLORIDE 40 MEQ: 10 CAPSULE, COATED, EXTENDED RELEASE ORAL at 11:25

## 2022-05-13 RX ADMIN — BUPIVACAINE HYDROCHLORIDE 10 ML: 2.5 INJECTION, SOLUTION EPIDURAL; INFILTRATION; INTRACAUDAL; PERINEURAL at 13:18

## 2022-05-13 RX ADMIN — HYDROMORPHONE HYDROCHLORIDE 0.5 MG: 1 INJECTION, SOLUTION INTRAMUSCULAR; INTRAVENOUS; SUBCUTANEOUS at 15:04

## 2022-05-13 NOTE — ANESTHESIA PROCEDURE NOTES
Psychiatric      Patient reassessed immediately prior to procedure    Patient location during procedure: pre-op  Reason for block: at surgeon's request and post-op pain management  Performed by  CRNA/CAA: Shine Blankenship, CRNA  Assisted by: Lori Kilgore RN  Preanesthetic Checklist  Completed: patient identified, IV checked, site marked, risks and benefits discussed, surgical consent, monitors and equipment checked, pre-op evaluation and timeout performed  Prep:  Sterile barriers:cap, gloves, mask and sterile barriers  Prep: ChloraPrep  Patient monitoring: blood pressure monitoring, continuous pulse oximetry and EKG  Procedure    Sedation: yes  Performed under: local infiltration  Guidance:ultrasound guided  Images:still images obtained, printed/placed on chart    Laterality:left  Block Type:interscalene  Injection Technique:catheter  Needle Type:Tuohy and echogenic  Needle Gauge:18 G  Resistance on Injection: none  Catheter Size:20 G (20g)  Cath Depth at skin: 8 cm    Medications Used: bupivacaine PF (MARCAINE) 0.25 % injection, 10 mL  Med administered at 5/13/2022 1:18 PM      Post Assessment  Injection Assessment: negative aspiration for heme, no paresthesia on injection and incremental injection  Patient Tolerance:comfortable throughout block  Complications:no  Additional Notes  Procedure:                 The pt was placed in semifowlers position with a slight tilt of the thorax contralateral to the insertion site.  The Insertion Site was prepped and draped in sterile fashion.  The pt was anesthetized with  IV Sedation( see meds) and  Skin and cutaneous tissue was infiltrated and anesthetized with 1% Lidocaine 3 mls via a 25g needle.  Utilizing ultrasound guidance, a BBraun 4 inch 18 g Contiplex echogenic touhy needle was advanced in-plane.  Hydro dissection of tissue was achieved with Normal saline. Major vessels(carotid and Internal Jugular) where visualized as the brachial plexus was approached at the  approximate level of C-7/ T-1.  Cervical 5 and Branches of Cervical 6 nerve roots where visualized and the needle tip was placed posterior at the level of C-6 roots.  LA spread was visualized and injection was made incrementally every 5 mls with aspiration. Injection pressure was normal or little, there was no intraneural injection, no vascular injection.      The BBraun 20 g wire stylet catheter was then placed under US guidance on the posterior aspect of the Brachial Plexus. The tuohy was removed and the location of catheter was confirmed with NS injection visualized with US . The skin was sealed with exofin tissue adhesive at catheter insertion site.  Skin was prepped with benzoin and the catheter was secured with steristrips and a CHG tegaderm. Appropriate labels applied. Thank You.

## 2022-05-13 NOTE — CASE MANAGEMENT/SOCIAL WORK
"Discharge Planning Assessment  [facility]     Patient Name: Heidi Tomlinson  MRN: [id number]  Today's Date: 5/13/2022    Admit Date: 5/12/2022     Discharge Needs Assessment    No documentation.                Discharge Plan     Row Name 05/13/22 1551       Plan    Plan Inpatient Rehab    Patient/Family in Agreement with Plan yes    Plan Comments Met with Ms. Tomlinson and her daughter, Adali, at the bedside for discharge planning.  Ms. Tomlinson lives in a home with her son, in [facility].  The patient and her son have separate living areas and her son will be unable to assist her when she returns home.  Adali lives approx 20 miles away and works full time.   Prior to admission, Ms. Tomlinson ambulated independently and was independent with her ADL's.  Only DME is a st cane in the home.     PCP is Alejo Negron.  No POA or ACP documents.    PT and OT have evaluated Ms. Tomlinson and \"Patient ambulated 80 feet with SPC and CGA, limited by fatigue and weakness. O2 sats between 95-98% on room air with ambulation. Chronic R knee pain, R LE weakness noted. Improved balance with mobility compared to yesterday. Will be alone most of the time at home. Recommend IRF at d/c.\"  Discussed rehab and Ms. Tomlinson requested referrals to 1. [facility] and 2. [facility].  The patient is Medicare OBS status.  Currently, [facility] is waiving the Medicare inpatient stay, however, [facility] is not, and Ms. Tomlinson would need to be appropriate for acute rehab.  Referrals given to facilities.   CM will follow up.    Final Discharge Disposition Code 03 - skilled nursing facility (SNF)              Continued Care and Services - Admitted Since 5/12/2022     Destination     Service Provider Request Status Selected Services Address Phone Fax Patient Preferred    [facility]  Pending - No Request Sent N/A [address] [facility] [id number] [phone] --    [facility]  Pending - " No Request Sent N/A [address], [facility] [id number] [phone] --              Expected Discharge Date and Time     Expected Discharge Date Expected Discharge Time    May 13, 2022                    Charlotte Presley, RN

## 2022-05-13 NOTE — PROGRESS NOTES
[facility]    Acute pain service Inpatient Progress Note    Patient Name: Heidi Tomlinson  :  1935  MRN:  [id number]        Acute Pain  Service Inpatient Progress Note:    Analgesia:Good  Pain Score:4/10  LOC: alert and awake  Resp Status: room air  Cardiac: VS stable  Side Effects:None  Catheter Site:clean, dressing intact and dry  Cath type: peripheral nerve cath with ON Q  Infusion rate: 6ml/hr  Catheter Plan:Catheter to remain Insitu and Continue catheter infusion rate unchanged  Comments: Pain is in elbow. Repositioned sling with relief

## 2022-05-13 NOTE — THERAPY EVALUATION
Patient Name: Heidi Tomlinson  : 1935    MRN: [id number]                              Today's Date: 2022       Admit Date: 2022    Visit Dx: No diagnosis found.  Patient Active Problem List   Diagnosis   • Glenohumeral arthritis, left   • S/P reverse total shoulder arthroplasty, left   • Hypokalemia     Past Medical History:   Diagnosis Date   • Arthritis      Past Surgical History:   Procedure Laterality Date   • CHOLECYSTECTOMY     • COLONOSCOPY     • HIP ARTHROPLASTY Right    • HYSTERECTOMY      partial   • KNEE ARTHROPLASTY Left    • TONSILLECTOMY        General Information     Row Name 22 1123          Physical Therapy Time and Intention    Document Type evaluation  -LR     Mode of Treatment physical therapy;individual therapy  -LR     Row Name 22 1123          General Information    Patient Profile Reviewed yes  -LR     Prior Level of Function min assist:;all household mobility;community mobility;gait;transfer;bed mobility  used SPC for mobility, ambulation limited by R knee pain  -LR     Existing Precautions/Restrictions fall;non-weight bearing;left;shoulder;other (see comments)  NWB L UE, sling to L UE, L interscalene nerve catheter  -LR     Barriers to Rehab medically complex;previous functional deficit  -LR     Row Name 22 1123          Living Environment    People in Home child(davey), adult;other (see comments)  son lives in different part of the home from patient, does not provide assist, daughter lives 20 minutes away and works full time  -LR     Row Name 22 1123          Home Main Entrance    Number of Stairs, Main Entrance none  -LR     Row Name 22 1123          Stairs Within Home, Primary    Number of Stairs, Within Home, Primary none  -LR     Row Name 22 1123          Cognition    Orientation Status (Cognition) oriented x 4  -LR     Row Name 22 1123          Safety Issues, Functional Mobility    Safety Issues Affecting  Function (Mobility) awareness of need for assistance;insight into deficits/self-awareness;safety precautions follow-through/compliance;safety precaution awareness  -LR     Impairments Affecting Function (Mobility) strength;endurance/activity tolerance;balance;pain;range of motion (ROM)  -LR           User Key  (r) = Recorded By, (t) = Taken By, (c) = Cosigned By    Initials Name Provider Type    LR Libertad Fairchild, PT Physical Therapist               Mobility     Row Name 05/13/22 [facility]          Bed Mobility    Supine-Sit [place] (Bed Mobility) not tested  -LR     Comment, (Bed Mobility) [facility] on arrival and at end of eval.  -LR     Row Name 05/13/22 1123          Transfers    Comment, (Transfers) Verbal cues to push up from chair with R UE to stand and to reach back for chair with R UE to lower into sitting. Patient required cues to not stand before being cued to do so.  -LR     Row Name 05/13/22 [facility]          Bed-Chair Transfer    Bed-Chair [place] (Transfers) not tested  -LR     Row Name 05/13/22 [facility]          Sit-Stand Transfer    Sit-Stand [place] (Transfers) verbal cues;contact guard;1 person to manage equipment  -LR     Assistive Device (Sit-Stand Transfers) other (see comments);cane, straight  -LR     Row Name 05/13/22 1123          Gait/Stairs (Locomotion)    [place] Level (Gait) verbal cues;contact guard;1 person to manage equipment  -LR     Assistive Device (Gait) cane, straight  -LR     Distance in Feet (Gait) 80  -LR     Deviations/Abnormal Patterns (Gait) bilateral deviations;poornima decreased;gait speed decreased;stride length decreased  -LR     Bilateral Gait Deviations forward flexed posture  -LR     [place] Level (Stairs) not tested  -LR     Comment, (Gait/Stairs) Patient ambulated with step through gait pattern at slow pace. Verbal cues for correct placement and sequencing of SPC. No LOB or unsteadiness observed with gait. Gait limited by fatigue and weakness.  -LR      Row Name 05/13/22 1123          Mobility    Extremity Weight-bearing Status left upper extremity  -LR     Left Upper Extremity (Weight-bearing Status) non weight-bearing (NWB)   -LR           User Key  (r) = Recorded By, (t) = Taken By, (c) = Cosigned By    Initials Name Provider Type    LR Libertad Fairchild, STEFAN Physical Therapist               Obj/Interventions     Row Name 05/13/22 1123          Range of Motion Comprehensive    General Range of Motion bilateral lower extremity ROM WFL  -LR     Row Name 05/13/22 1123          Strength Comprehensive (MMT)    General Manual Muscle Testing (MMT) Assessment lower extremity strength deficits identified  -LR     Row Name 05/13/22 1123          Balance    Balance Assessment sitting static balance;sitting dynamic balance;standing static balance;standing dynamic balance  -LR     Static Sitting Balance supervision  -LR     Dynamic Sitting Balance supervision  -LR     Position, Sitting Balance unsupported;sitting in chair  -LR     Static Standing Balance verbal cues;contact guard;1 person to manage equipment  -LR     Dynamic Standing Balance verbal cues;contact guard;1 person to manage equipment  -LR     Position/Device Used, Standing Balance supported;cane, straight  -LR     Row Name 05/13/22 1123          Sensory Assessment (Somatosensory)    Sensory Assessment (Somatosensory) LE sensation intact;other (see comments)  denies numbness /tingling in B LEs and in L hand, able to move L wrist and fingers of L hand today  -LR     Row Name 05/13/22 1123          Lower Extremity (Manual Muscle Testing)    Comment, MMT: Lower Extremity L hip flexors: 4-/5, R hip flexors: 3/5, L knee extensors: 5/5, R knee extensors: 4+/5, L knee flexors: 5/5, R knee flexors: 4+/5, B ankle DFs: 4+/5  -LR           User Key  (r) = Recorded By, (t) = Taken By, (c) = Cosigned By    Initials Name Provider Type    Libertad Sauceda PT Physical Therapist               Goals/Plan     Row Name  05/13/22 1123          Bed Mobility Goal 1 (PT)    Activity/Assistive Device (Bed Mobility Goal 1, PT) sit to supine/supine to sit  -LR     [place] Level/Cues Needed (Bed Mobility Goal 1, PT) modified independence  -LR     Time Frame (Bed Mobility Goal 1, PT) long term goal (LTG);5 days  -LR     Progress/Outcomes (Bed Mobility Goal 1, PT) goal ongoing  -LR     Row Name 05/13/22 1123          Transfer Goal 1 (PT)    Activity/Assistive Device (Transfer Goal 1, PT) sit-to-stand/stand-to-sit;walker, rolling  -LR     [place] Level/Cues Needed (Transfer Goal 1, PT) modified independence  -LR     Time Frame (Transfer Goal 1, PT) long term goal (LTG);5 days  -LR     Progress/Outcome (Transfer Goal 1, PT) goal ongoing  -LR     Row Name 05/13/22 1123          Gait Training Goal 1 (PT)    Activity/Assistive Device (Gait Training Goal 1, PT) gait (walking locomotion);cane, straight  -LR     [place] Level (Gait Training Goal 1, PT) supervision required  -LR     Distance (Gait Training Goal 1, PT) 150 feet  -LR     Time Frame (Gait Training Goal 1, PT) long term goal (LTG);5 days  -LR     Progress/Outcome (Gait Training Goal 1, PT) goal ongoing  -LR     Row Name 05/13/22 1123          Therapy Assessment/Plan (PT)    Planned Therapy Interventions (PT) balance training;bed mobility training;gait training;home exercise program;patient/family education;ROM (range of motion);strengthening;transfer training  -LR           User Key  (r) = Recorded By, (t) = Taken By, (c) = Cosigned By    Initials Name Provider Type    LR Libertad Fairchild, PT Physical Therapist               Clinical Impression     Row Name 05/13/22 1123          Pain    Pretreatment Pain Rating 7/10  -LR     Posttreatment Pain Rating 7/10  -LR     Pain Location - Side/Orientation Left  -LR     Pain Location anterior  -LR     Pain Location - shoulder  -LR     Pain Intervention(s) Ambulation/increased activity;Repositioned  -LR     Row Name 05/13/22  1123          Plan of Care Review    Plan of Care Reviewed With patient  -LR     Progress improving  -LR     Outcome Evaluation Patient ambulated 80 feet with SPC and CGA, limited by fatigue and weakness. O2 sats between 95-98% on room air with ambulation. Chronic R knee pain, R LE weakness noted. Improved balance with mobility compared to yesterday. Will be alone most of the time at home. Recommend IRF at d/c. Will continue to progress as able. Encouraged frequent ambulation.  -LR     Row Name 05/13/22 1123          Therapy Assessment/Plan (PT)    Patient/Family Therapy Goals Statement (PT) get better  -LR     Rehab Potential (PT) good, to achieve stated therapy goals  -LR     Criteria for Skilled Interventions Met (PT) yes;meets criteria;skilled treatment is necessary  -LR     Therapy Frequency (PT) daily  -LR     Row Name 05/13/22 1123          Vital Signs    Pre SpO2 (%) 98  -LR     O2 Delivery Pre Treatment room air  -LR     Intra SpO2 (%) 95  -LR     O2 Delivery Intra Treatment room air  -LR     Post SpO2 (%) 97  -LR     O2 Delivery Post Treatment room air  -LR     Pre Patient Position Sitting  -LR     Intra Patient Position Standing  -LR     Post Patient Position Sitting  -LR     Row Name 05/13/22 1123          Positioning and Restraints    Pre-Treatment Position sitting in chair/recliner  -LR     Post Treatment Position chair  -LR     In Chair notified nsg;sitting;reclined;call light within reach;encouraged to call for assist;exit alarm on  -LR           User Key  (r) = Recorded By, (t) = Taken By, (c) = Cosigned By    Initials Name Provider Type    LR Libertad Fairchild, PT Physical Therapist               Outcome Measures     Row Name 05/13/22 1123          How much help from another person do you currently need...    Turning from your back to your side while in flat bed without using bedrails? 3  -LR     Moving from lying on back to sitting on the side of a flat bed without bedrails? 3  -LR     Moving  to and from a bed to a chair (including a wheelchair)? 3  -LR     Standing up from a chair using your arms (e.g., wheelchair, bedside chair)? 3  -LR     Climbing 3-5 steps with a railing? 2  -LR     To walk in hospital room? 3  -LR     AM-PAC 6 Clicks Score (PT) 17  -LR     Highest level of mobility 5 --> Static standing  -LR     Row Name 05/13/22 1123          Functional Assessment    Outcome Measure Options AM-PAC 6 Clicks Basic Mobility (PT)  -LR           User Key  (r) = Recorded By, (t) = Taken By, (c) = Cosigned By    Initials Name Provider Type    LR Libertad Fairchild, PT Physical Therapist                             Physical Therapy Education                 Title: PT OT SLP Therapies (Done)     Topic: Physical Therapy (Done)     Point: Mobility training (Done)     Learning Progress Summary           Patient Acceptance, E,D, VU,NR by LR at 5/13/2022 1123    Comment: Educated on NWB status of L UE, precautions, safety with mobility, correct sit<->stand t/f technique, correct gait mechanics, benefits of frequent ambulation, and progression of POC.                   Point: Home exercise program (Done)     Learning Progress Summary           Patient Acceptance, E,D, VU,NR by LR at 5/13/2022 1123    Comment: Educated on NWB status of L UE, precautions, safety with mobility, correct sit<->stand t/f technique, correct gait mechanics, benefits of frequent ambulation, and progression of POC.                   Point: Body mechanics (Done)     Learning Progress Summary           Patient Acceptance, E,D, VU,NR by LR at 5/13/2022 1123    Comment: Educated on NWB status of L UE, precautions, safety with mobility, correct sit<->stand t/f technique, correct gait mechanics, benefits of frequent ambulation, and progression of POC.                   Point: Precautions (Done)     Learning Progress Summary           Patient Acceptance, E,D, VU,NR by LR at 5/13/2022 1123    Comment: Educated on NWB status of L UE,  precautions, safety with mobility, correct sit<->stand t/f technique, correct gait mechanics, benefits of frequent ambulation, and progression of POC.                               User Key     Initials Effective Dates Name Provider Type Discipline    LR 06/16/21 -  Libertad Fairchild, PT Physical Therapist PT              PT Recommendation and Plan  Planned Therapy Interventions (PT): balance training, bed mobility training, gait training, home exercise program, patient/family education, ROM (range of motion), strengthening, transfer training  Plan of Care Reviewed With: patient  Progress: improving  Outcome Evaluation: Patient ambulated 80 feet with SPC and CGA, limited by fatigue and weakness. O2 sats between 95-98% on room air with ambulation. Chronic R knee pain, R LE weakness noted. Improved balance with mobility compared to yesterday. Will be alone most of the time at home. Recommend IRF at d/c. Will continue to progress as able. Encouraged frequent ambulation.     Time Calculation:    PT Charges     Row Name 05/13/22 1123             Time Calculation    Start Time 1123  -LR      PT Received On 05/13/22  -LR      PT Goal Re-Cert Due Date 05/23/22  -LR              Timed Charges    98778 - Gait Training Minutes  8  -LR              Untimed Charges    PT Eval/Re-eval Minutes 33  -LR              Total Minutes    Timed Charges Total Minutes 8  -LR      Untimed Charges Total Minutes 33  -LR       Total Minutes 41  -LR            User Key  (r) = Recorded By, (t) = Taken By, (c) = Cosigned By    Initials Name Provider Type    LR Libertad Fairchild, PT Physical Therapist              Therapy Charges for Today     Code Description Service Date Service Provider Modifiers Qty    34604097302 HC GAIT TRAINING EA 15 MIN 5/13/2022 Libertad Fairchild, PT GP 1    [id number]  PT THER SUPP EA 15 MIN 5/13/2022 Libertad Fairchild, PT GP 3    [id number]  PT EVAL LOW COMPLEXITY 3 5/13/2022 Libertad Fairchild  Hilda, PT GP 1          PT G-Codes  Outcome Measure Options: AM-PAC 6 Clicks Basic Mobility (PT)  AM-PAC 6 Clicks Score (PT): 17  AM-PAC 6 Clicks Score (OT): 14    Libertad Fairchild, PT  5/13/2022

## 2022-05-13 NOTE — ACP (ADVANCE CARE PLANNING)
provided education regarding living will and left an Advanced Care Planning brochure with the patient to discuss with her family. Patient not ready to complete form, but will contact Pastoral Care if and when ready.

## 2022-05-13 NOTE — PLAN OF CARE
Goal Outcome Evaluation:  Plan of Care Reviewed With: patient, daughter        Progress: improving  Outcome Evaluation: Reviewed education with pt. and CG on weight bearing restrictions and shoulder precautions for ADLs.  Educated on axillary care, katharine technique for UBD, sling mgmt, interscalene catheter mgmt and HEP. CG verbalized and demonstrated understanding. Pt. completes transfers with Min A and RUE support. Required Max A to don/doff sling. Conitnue to recommend IPR at discharge.

## 2022-05-13 NOTE — PROGRESS NOTES
Orthopedic Daily Progress Note      CC: L reverse TSA    Pain well controlled  General: no fevers, chills  Abdomen: no nausea, vomiting, or diarrhea    No other complaints    Physical Exam:  I have reviewed the vital signs.  Temp:  [97.3 °F (36.3 °C)-98.8 °F (37.1 °C)] 98.6 °F (37 °C)  Heart Rate:  [70-96] 88  Resp:  [15-18] 16  BP: (124-148)/(55-76) 133/62    Objective  General Appearance:    Alert, cooperative, no distress  Extremities: No clubbing, cyanosis, or edema to lower extremities  Pulses:  2+ in distal surgical extremity  Skin: Dressing Clean/dry/intact      Results Review:    I have reviewed the labs, radiology results and diagnostic studies:    Results from last 7 days   Lab Units 05/13/22  0528   WBC 10*3/mm3 7.68   HEMOGLOBIN g/dL 7.9*   PLATELETS 10*3/mm3 328     Results from last 7 days   Lab Units 05/13/22  0528   SODIUM mmol/L 136   POTASSIUM mmol/L 3.2*   CO2 mmol/L 26.0   CREATININE mg/dL 0.46*   GLUCOSE mg/dL 113*       I have reviewed the medications.    Assessment/Problem List  POD# 1 Day Post-Op   S/p L Reverse TSA    Plan  1) sling  2) OT- to instruct on ADLs, and elbow wrist and hand ROM  3) dressing to remain intact until followup  4) po pain meds, ISB block        Discharge Planning: I expect patient to be discharged to home likely today if clears OT/PT.    Mushtaq Walsh MD  05/13/22  09:41 EDT

## 2022-05-13 NOTE — PLAN OF CARE
Goal Outcome Evaluation:  Plan of Care Reviewed With: patient        Progress: improving     Patient is alert and oriented. Complaints of pain in left shoulder with interventions being effective. Voids spontaneously per BSC. V/S remain stable. Will continue to monitor.

## 2022-05-13 NOTE — PROGRESS NOTES
"IM progress note      Heidi Tomlinson  [id number]  1935     LOS: 0 days     Attending: Mushtaq Walsh MD    Primary Care Provider: Alejo Negron MD      Chief Complaint/Reason for visit:  No chief complaint on file.      Subjective   Feels okay.  Good pain control.  Not sleeping well.  Has some gas pain yesterday, improved today.    Objective        Visit Vitals  /62 (BP Location: Right arm, Patient Position: Lying)   Pulse 88   Temp 98.6 °F (37 °C) (Oral)   Resp 16   Ht 160 cm (63\")   Wt 62.6 kg (138 lb)   SpO2 94%   BMI 24.45 kg/m²     Temp (24hrs), Av.3 °F (36.8 °C), Min:97.3 °F (36.3 °C), Max:98.8 °F (37.1 °C)      Intake/Output:    Intake/Output Summary (Last 24 hours) at 2022 0949  Last data filed at 2022  Gross per 24 hour   Intake 480 ml   Output --   Net 480 ml        Physical Therapy:  2022:   Progress: improving  Outcome Evaluation: Patient ambulated 80 feet with SPC and CGA, limited by fatigue and weakness. O2 sats between 95-98% on room air with ambulation. Chronic R knee pain, R LE weakness noted. Improved balance with mobility compared to yesterday. Will be alone most of the time at home. Recommend IRF at d/c. Will continue to progress as able. Encouraged frequent ambulation.    Physical Exam:     General Appearance:    Alert, cooperative, in no acute distress   Head:    Normocephalic, without obvious abnormality, atraumatic    Lungs:     Normal effort, symmetric chest rise,  clear to      auscultation bilaterally              Heart:    Regular rhythm and normal rate, normal S1 and S2    Abdomen:     Normal bowel sounds, no masses, no organomegaly, soft        non-tender, non-distended, no guarding, no rebound                tenderness   Extremities:  Left upper extremity in a sling.  Clean dry intact Aquacel dressing.  Peripheral nerve block catheter present.  Moves her wrist and hand without difficulty.  Cap refill distally intact.  No clubbing, " cyanosis or edema.  No deformities.    Pulses:   Pulses palpable and equal bilaterally   Skin:   No bleeding, bruising or rash          Results Review:     I reviewed the patient's new clinical results.   Results from last 7 days   Lab Units 05/13/22 0528 05/10/22  0827   WBC 10*3/mm3 7.68 5.81   HEMOGLOBIN g/dL 7.9* 10.2*   HEMATOCRIT % 24.4* 32.9*   PLATELETS 10*3/mm3 328 407     Results from last 7 days   Lab Units 05/13/22 0528 05/10/22  0827   SODIUM mmol/L 136 136   POTASSIUM mmol/L 3.2* 3.5   CHLORIDE mmol/L 101 97*   CO2 mmol/L 26.0 27.0   BUN mg/dL 8 15   CREATININE mg/dL 0.46* 0.55*   CALCIUM mg/dL 8.4* 9.5   BILIRUBIN mg/dL  --  0.3   ALK PHOS U/L  --  95   ALT (SGPT) U/L  --  11   AST (SGOT) U/L  --  20   GLUCOSE mg/dL 113* 106*     I reviewed the patient's new imaging including images and reports.    All medications reviewed.      acetaminophen, 650 mg, Q4H PRN   Or  acetaminophen, 650 mg, Q4H PRN  HYDROmorphone, 0.5 mg, Q2H PRN   And  naloxone, 0.1 mg, Q5 Min PRN  labetalol, 10 mg, Q4H PRN  ondansetron, 4 mg, Q6H PRN   Or  ondansetron, 4 mg, Q6H PRN  oxyCODONE, 10 mg, Q4H PRN  oxyCODONE, 5 mg, Q4H PRN  sodium chloride, 500 mL, TID PRN        Assessment & Plan       S/P reverse total shoulder arthroplasty, left    Glenohumeral arthritis, left    Hypokalemia, replaced  Acute blood loss anemia, monitor, no transfusion anticipated.     Plan   1. PT/OT. NWB, left UE, ROM hand, wrist, elbow.  2. Pain control-prns, interscalene nerve block cath with ropivacaine infusion.           3. IS-encourage  4. DVT proph- Mech/ mobilize.  5. Bowel regimen  6. Resume home medications as appropriate  7. Monitor post-op labs    Potassium and magnesium replacement per protocol.    DC planning.  Inpatient rehab recommended.   following.      Vicki Scott MD  05/13/22  09:49 EDT

## 2022-05-13 NOTE — THERAPY TREATMENT NOTE
Patient Name: Heidi Tomlinson  : 1935    MRN: [id number]                              Today's Date: 2022       Admit Date: 2022    Visit Dx: No diagnosis found.  Patient Active Problem List   Diagnosis   • Glenohumeral arthritis, left   • S/P reverse total shoulder arthroplasty, left   • Hypokalemia     Past Medical History:   Diagnosis Date   • Arthritis      Past Surgical History:   Procedure Laterality Date   • CHOLECYSTECTOMY     • COLONOSCOPY     • HIP ARTHROPLASTY Right    • HYSTERECTOMY      partial   • KNEE ARTHROPLASTY Left    • TONSILLECTOMY     • TOTAL SHOULDER ARTHROPLASTY W/ DISTAL CLAVICLE EXCISION Left 2022    Procedure: TOTAL SHOULDER REVERSE ARTHROPLASTY LEFT WITH SUBSCPULAR REPAIR;  Surgeon: Mushtaq Walsh MD;  Location: [facility];  Service: Orthopedics;  Laterality: Left;      General Information     Row Name 22 1516          OT Time and Intention    Document Type therapy note (daily note)  -     Mode of Treatment occupational therapy  -     Row Name 22 1516          General Information    Patient Profile Reviewed yes  -     Prior Level of Function min assist:;all household mobility;transfer;ADL's  -     Existing Precautions/Restrictions fall;non-weight bearing;left;shoulder;other (see comments)  LUE Sling, Interscalene Catheter  -     Barriers to Rehab previous functional deficit  -LC     Row Name 22 1516          Cognition    Orientation Status (Cognition) oriented x 4  -     Row Name 22 1516          Safety Issues, Functional Mobility    Safety Issues Affecting Function (Mobility) awareness of need for assistance;insight into deficits/self-awareness;safety precaution awareness;safety precautions follow-through/compliance;sequencing abilities  -     Impairments Affecting Function (Mobility) balance;strength;endurance/activity tolerance;pain;range of motion (ROM)  -           User Key  (r) = Recorded By, (t) =  Taken By, (c) = Cosigned By    Initials Name Provider Type     Libertad Caro, OT Occupational Therapist                 Mobility/ADL's     Row Name 05/13/22 1518          Bed Mobility    Bed Mobility scooting/bridging;supine-sit  -     Scooting/Bridging [place] (Bed Mobility) minimum assist (75% patient effort);verbal cues  -     Supine-Sit [place] (Bed Mobility) minimum assist (75% patient effort);verbal cues  -     Bed Mobility, Safety Issues decreased use of arms for pushing/pulling  -     Assistive Device (Bed Mobility) bed rails;head of bed elevated  -     Comment, (Bed Mobility) VC's to sequence transitioning from supine to sit EOB.  -     Row Name 05/13/22 1518          Transfers    Transfers sit-stand transfer;bed-chair transfer  -     Comment, (Transfers) VC's for R hand placement and sequencing. Educated pt. on interscalene catheter mgmt during t/fs to prevent dislodgement.  -     Bed-Chair [place] (Transfers) minimum assist (75% patient effort);verbal cues  -     Assistive Device (Bed-Chair Transfers) other (see comments)  RUE support  -     Sit-Stand [place] (Transfers) minimum assist (75% patient effort);verbal cues  -     Row Name 05/13/22 1518          Sit-Stand Transfer    Assistive Device (Sit-Stand Transfers) other (see comments)  RUE support  -     Row Name 05/13/22 1518          Activities of Daily Living    BADL Assessment/Intervention bathing;upper body dressing  -     Row Name 05/13/22 1518          Mobility    Extremity Weight-bearing Status left upper extremity  -     Left Upper Extremity (Weight-bearing Status) non weight-bearing (NWB)   -     Row Name 05/13/22 1518          Upper Body Dressing Assessment/Training    [place] Level (Upper Body Dressing) don;doff;verbal cues;maximum assist (25% patient effort)  SLING  -     Position (Upper Body Dressing) supported sitting  -     Comment, (Upper Body Dressing) Reviewed education  with pt. and CG on katharine dressing technique, sling mgmt, and interscalene catheter mgmt during UBD while maintaining shoulder precautions. Pt. and CG verbalized and demonstrated understanding. Removed abduction pillow to improve comfort.  -[facility] Name 05/13/22 1518          Bathing Assessment/Intervention    Comment, (Bathing) Reviewed education and simulated axillary care while maintaining shoulder precautions. Educated pt. to not shower until interscalene catheter is discontinued and cleared with MD.  -           User Key  (r) = Recorded By, (t) = Taken By, (c) = Cosigned By    Initials Name Provider Type     Libertad Caro OT Occupational Therapist               Obj/Interventions     [facility] Name 05/13/22 1527          Elbow/Forearm (Therapeutic Exercise)    Elbow/Forearm (Therapeutic Exercise) AAROM (active assistive range of motion)  -     Elbow/Forearm AAROM (Therapeutic Exercise) left;flexion;extension;supination;pronation;10 repetitions  -[facility] Name 05/13/22 1527          Wrist (Therapeutic Exercise)    Wrist (Therapeutic Exercise) AROM (active range of motion)  -     Wrist AROM (Therapeutic Exercise) left;flexion;extension;10 repetitions  -[facility] Name 05/13/22 1527          Hand (Therapeutic Exercise)    Hand (Therapeutic Exercise) AROM (active range of motion)  -     Hand AROM/AAROM (Therapeutic Exercise) left;AROM (active range of motion);finger flexion;finger extension;thumb flexion;thumb extension;10 repetitions  -[facility] Name 05/13/22 1527          Motor Skills    Therapeutic Exercise elbow/forearm;wrist;hand  Educated to complete HEP 3-5x/day, AROM elbow/wrist/hand only per MD order. Verbalized and demonstrated understanding.  -     Row Name 05/13/22 1527          Balance    Balance Assessment sitting static balance;sitting dynamic balance;standing static balance;standing dynamic balance  -     Static Sitting Balance supervision  -     Dynamic Sitting Balance supervision   -LC     Position, Sitting Balance unsupported;sitting in chair  -     Static Standing Balance contact guard;verbal cues  -     Dynamic Standing Balance contact guard;verbal cues  -     Position/Device Used, Standing Balance supported;other (see comments)  RUE support  -     Balance Interventions sitting;standing;weight shifting activity  -           User Key  (r) = Recorded By, (t) = Taken By, (c) = Cosigned By    Initials Name Provider Type     Libertad Caro OT Occupational Therapist               Goals/Plan    No documentation.                Clinical Impression     Row Name 05/13/22 1529          Pain Assessment    Pretreatment Pain Rating 6/10  -     Posttreatment Pain Rating 5/10  -     Pain Location - Side/Orientation Left  -     Pain Location generalized  -     Pain Location - shoulder  -LC     Pain Intervention(s) Repositioned;Ambulation/increased activity  -     Row Name 05/13/22 1529          Plan of Care Review    Plan of Care Reviewed With patient;daughter  -     Progress improving  -     Outcome Evaluation Reviewed education with pt. ad CG on weight bearing restrictions and shoulder precautions for ADLs.  Educated on axillary care, katharine technique for UBD, sling mgmt, interscalene catheter mgmt and HEP. CG verbalized and demonstrated understanding. Pt. completes transfers with Min A and RUE support. Required Max A to don/doff sling. Conitnue to recommend IPR at discharge.  -     Row Name 05/13/22 1529          Therapy Plan Review/Discharge Plan (OT)    Anticipated Discharge Disposition (OT) inpatient rehabilitation facility  -     Row Name 05/13/22 1529          Vital Signs    Pre Systolic BP Rehab --  VSS  -     Pre Patient Position Supine  -     Intra Patient Position Standing  -     Post Patient Position Sitting  -     Row Name 05/13/22 1529          Positioning and Restraints    Pre-Treatment Position in bed  -     Post Treatment Position chair  -     In  Chair notified nsg;reclined;call light within reach;encouraged to call for assist;exit alarm on;legs elevated;waffle cushion  -           User Key  (r) = Recorded By, (t) = Taken By, (c) = Cosigned By    Initials Name Provider Type    Libertad Rader OT Occupational Therapist               Outcome Measures     Row Name 05/13/22 1535          How much help from another is currently needed...    Putting on and taking off regular lower body clothing? 2  -LC     Bathing (including washing, rinsing, and drying) 2  -LC     Toileting (which includes using toilet bed pan or urinal) 2  -LC     Putting on and taking off regular upper body clothing 2  -LC     Taking care of personal grooming (such as brushing teeth) 3  -LC     Eating meals 3  -LC     AM-PAC 6 Clicks Score (OT) 14  -LC     Row Name 05/13/22 1123          How much help from another person do you currently need...    Turning from your back to your side while in flat bed without using bedrails? 3  -LR     Moving from lying on back to sitting on the side of a flat bed without bedrails? 3  -LR     Moving to and from a bed to a chair (including a wheelchair)? 3  -LR     Standing up from a chair using your arms (e.g., wheelchair, bedside chair)? 3  -LR     Climbing 3-5 steps with a railing? 2  -LR     To walk in hospital room? 3  -LR     AM-PAC 6 Clicks Score (PT) 17  -LR     Highest level of mobility 5 --> Static standing  -LR     Row Name 05/13/22 1535 05/13/22 1123       Functional Assessment    Outcome Measure Options AM-PAC 6 Clicks Daily Activity (OT)  - AM-PAC 6 Clicks Basic Mobility (PT)  -LR          User Key  (r) = Recorded By, (t) = Taken By, (c) = Cosigned By    Initials Name Provider Type    Libertad Sauceda, PT Physical Therapist    Libertad Rader OT Occupational Therapist                Occupational Therapy Education                 Title: PT OT SLP Therapies (Done)     Topic: Occupational Therapy (Done)     Point: ADL training  (Done)     Description:   Instruct learner(s) on proper safety adaptation and remediation techniques during self care or transfers.   Instruct in proper use of assistive devices.              Learning Progress Summary           Patient Acceptance, E,D, NR,VU by  at 5/13/2022 1333    Acceptance, E,TB,D, VU,NR by  at 5/12/2022 1432   Family Acceptance, E,D, NR,VU by  at 5/13/2022 1333                   Point: Home exercise program (Done)     Description:   Instruct learner(s) on appropriate technique for monitoring, assisting and/or progressing therapeutic exercises/activities.              Learning Progress Summary           Patient Acceptance, E,D, NR,VU by  at 5/13/2022 1333    Acceptance, E,TB,D, VU,NR by  at 5/12/2022 1432   Family Acceptance, E,D, NR,VU by  at 5/13/2022 1333                   Point: Precautions (Done)     Description:   Instruct learner(s) on prescribed precautions during self-care and functional transfers.              Learning Progress Summary           Patient Acceptance, E,D, NR,VU by  at 5/13/2022 1333    Acceptance, E,TB,D, VU,NR by  at 5/12/2022 1432   Family Acceptance, E,D, NR,VU by  at 5/13/2022 1333                   Point: Body mechanics (Done)     Description:   Instruct learner(s) on proper positioning and spine alignment during self-care, functional mobility activities and/or exercises.              Learning Progress Summary           Patient Acceptance, E,D, NR,VU by  at 5/13/2022 1333    Acceptance, E,TB,D, VU,NR by  at 5/12/2022 1432   Family Acceptance, E,D, NR,VU by  at 5/13/2022 1333                               User Key     Initials Effective Dates Name Provider Type Discipline     06/16/21 -  Huong Luna, OT Occupational Therapist OT     06/16/21 -  Libertad Caro OT Occupational Therapist OT              OT Recommendation and Plan     Plan of Care Review  Plan of Care Reviewed With: patient, daughter  Progress: improving  Outcome  Evaluation: Reviewed education with pt. ad CG on weight bearing restrictions and shoulder precautions for ADLs.  Educated on axillary care, katharine technique for UBD, sling mgmt, interscalene catheter mgmt and HEP. CG verbalized and demonstrated understanding. Pt. completes transfers with Min A and RUE support. Required Max A to don/doff sling. Conitnue to recommend IPR at discharge.     Time Calculation:    Time Calculation- OT     Row Name 05/13/22 1536 05/13/22 1123          Time Calculation- OT    OT Start Time 1333  -LC --     OT Received On 05/13/22  - --     OT Goal Re-Cert Due Date 05/22/22  - --            Timed Charges    24469 - OT Therapeutic Exercise Minutes 12  -LC --     13639 - Gait Training Minutes  -- 8  -LR     02679 - OT Self Care/Mgmt Minutes 20  -LC --            Total Minutes    Timed Charges Total Minutes 32  -LC 8  -LR      Total Minutes 32  -LC 8  -LR           User Key  (r) = Recorded By, (t) = Taken By, (c) = Cosigned By    Initials Name Provider Type    LR Libertad Fairchild, PT Physical Therapist    LC Libertad Caro OT Occupational Therapist              Therapy Charges for Today     Code Description Service Date Service Provider Modifiers Qty    18686817975  OT THER PROC EA 15 MIN 5/13/2022 Libertad Caro OT GO 1    [id number]  OT SELF CARE/MGMT/TRAIN EA 15 MIN 5/13/2022 Libertad Caro OT GO 1               Libertad Caro OT  5/13/2022

## 2022-05-13 NOTE — PLAN OF CARE
Problem: Adult Inpatient Plan of Care  Goal: Plan of Care Review  Recent Flowsheet Documentation  Taken 5/13/2022 1123 by Libertad Fairchild, PT  Progress: improving  Plan of Care Reviewed With: patient  Outcome Evaluation: Patient ambulated 80 feet with SPC and CGA, limited by fatigue and weakness. O2 sats between 95-98% on room air with ambulation. Chronic R knee pain, R LE weakness noted. Improved balance with mobility compared to yesterday. Will be alone most of the time at home. Recommend IRF at d/c. Will continue to progress as able. Encouraged frequent ambulation.   Goal Outcome Evaluation:  Plan of Care Reviewed With: patient        Progress: improving  Outcome Evaluation: Patient ambulated 80 feet with SPC and CGA, limited by fatigue and weakness. O2 sats between 95-98% on room air with ambulation. Chronic R knee pain, R LE weakness noted. Improved balance with mobility compared to yesterday. Will be alone most of the time at home. Recommend IRF at d/c. Will continue to progress as able. Encouraged frequent ambulation.

## 2022-05-14 LAB
HCT VFR BLD AUTO: 28.2 % (ref 34–46.6)
HGB BLD-MCNC: 9.1 G/DL (ref 12–15.9)
POTASSIUM SERPL-SCNC: 3.4 MMOL/L (ref 3.5–5.2)

## 2022-05-14 PROCEDURE — 97116 GAIT TRAINING THERAPY: CPT

## 2022-05-14 PROCEDURE — A9270 NON-COVERED ITEM OR SERVICE: HCPCS | Performed by: ORTHOPAEDIC SURGERY

## 2022-05-14 PROCEDURE — A9270 NON-COVERED ITEM OR SERVICE: HCPCS | Performed by: INTERNAL MEDICINE

## 2022-05-14 PROCEDURE — 63710000001 OXYCODONE 5 MG TABLET: Performed by: ORTHOPAEDIC SURGERY

## 2022-05-14 PROCEDURE — 63710000001 MELATONIN 5 MG TABLET: Performed by: INTERNAL MEDICINE

## 2022-05-14 PROCEDURE — 84132 ASSAY OF SERUM POTASSIUM: CPT | Performed by: INTERNAL MEDICINE

## 2022-05-14 PROCEDURE — 63710000001 ACETAMINOPHEN 325 MG TABLET: Performed by: ORTHOPAEDIC SURGERY

## 2022-05-14 PROCEDURE — 63710000001 HYDROXYZINE 25 MG TABLET: Performed by: INTERNAL MEDICINE

## 2022-05-14 PROCEDURE — 85014 HEMATOCRIT: CPT | Performed by: INTERNAL MEDICINE

## 2022-05-14 PROCEDURE — 0 MAGNESIUM SULFATE 4 GM/100ML SOLUTION: Performed by: INTERNAL MEDICINE

## 2022-05-14 PROCEDURE — 63710000001 POTASSIUM CHLORIDE 10 MEQ CAPSULE CONTROLLED-RELEASE: Performed by: INTERNAL MEDICINE

## 2022-05-14 PROCEDURE — 85018 HEMOGLOBIN: CPT | Performed by: INTERNAL MEDICINE

## 2022-05-14 PROCEDURE — 97535 SELF CARE MNGMENT TRAINING: CPT | Performed by: OCCUPATIONAL THERAPIST

## 2022-05-14 PROCEDURE — 25010000002 HYDROMORPHONE PER 4 MG: Performed by: ORTHOPAEDIC SURGERY

## 2022-05-14 PROCEDURE — 97110 THERAPEUTIC EXERCISES: CPT | Performed by: OCCUPATIONAL THERAPIST

## 2022-05-14 PROCEDURE — 25010000002 ONDANSETRON PER 1 MG: Performed by: ORTHOPAEDIC SURGERY

## 2022-05-14 RX ORDER — CHOLECALCIFEROL (VITAMIN D3) 125 MCG
5 CAPSULE ORAL NIGHTLY PRN
Status: DISCONTINUED | OUTPATIENT
Start: 2022-05-14 | End: 2022-05-16 | Stop reason: HOSPADM

## 2022-05-14 RX ORDER — ACETAMINOPHEN 500 MG
1000 TABLET ORAL EVERY 12 HOURS
Status: DISCONTINUED | OUTPATIENT
Start: 2022-05-15 | End: 2022-05-16 | Stop reason: HOSPADM

## 2022-05-14 RX ORDER — HYDROXYZINE HYDROCHLORIDE 25 MG/1
25 TABLET, FILM COATED ORAL EVERY 6 HOURS PRN
Status: DISCONTINUED | OUTPATIENT
Start: 2022-05-14 | End: 2022-05-16 | Stop reason: HOSPADM

## 2022-05-14 RX ADMIN — HYDROXYZINE HYDROCHLORIDE 25 MG: 25 TABLET, FILM COATED ORAL at 17:05

## 2022-05-14 RX ADMIN — OXYCODONE 5 MG: 5 TABLET ORAL at 14:17

## 2022-05-14 RX ADMIN — MAGNESIUM SULFATE IN WATER 4 G: 40 INJECTION, SOLUTION INTRAVENOUS at 12:54

## 2022-05-14 RX ADMIN — ACETAMINOPHEN 650 MG: 325 TABLET ORAL at 15:38

## 2022-05-14 RX ADMIN — ONDANSETRON 4 MG: 2 INJECTION INTRAMUSCULAR; INTRAVENOUS at 00:52

## 2022-05-14 RX ADMIN — OXYCODONE 5 MG: 5 TABLET ORAL at 15:39

## 2022-05-14 RX ADMIN — OXYCODONE 5 MG: 5 TABLET ORAL at 08:59

## 2022-05-14 RX ADMIN — POTASSIUM CHLORIDE 40 MEQ: 750 CAPSULE, EXTENDED RELEASE ORAL at 17:05

## 2022-05-14 RX ADMIN — HYDROMORPHONE HYDROCHLORIDE 0.5 MG: 1 INJECTION, SOLUTION INTRAMUSCULAR; INTRAVENOUS; SUBCUTANEOUS at 15:01

## 2022-05-14 RX ADMIN — Medication 5 MG: at 21:36

## 2022-05-14 RX ADMIN — POTASSIUM CHLORIDE 40 MEQ: 750 CAPSULE, EXTENDED RELEASE ORAL at 12:54

## 2022-05-14 RX ADMIN — HYDROMORPHONE HYDROCHLORIDE 0.5 MG: 1 INJECTION, SOLUTION INTRAMUSCULAR; INTRAVENOUS; SUBCUTANEOUS at 04:05

## 2022-05-14 NOTE — PLAN OF CARE
Goal Outcome Evaluation:  Plan of Care Reviewed With: patient        Progress: declining  Outcome Evaluation: OT reviewed L shoulder precautions, ADL retraining to maintain, sling management and LUE HEP. Pt tearful with c/o - nurse notified. She declined further OOB activity following PT session. Recommend IPR.

## 2022-05-14 NOTE — PROGRESS NOTES
[facility]    Acute pain service Inpatient Progress Note    Patient Name: Heidi Tomlinson  :  1935  MRN:  [id number]        Acute Pain  Service Inpatient Progress Note:    Analgesia:Poor  LOC: alert and awake  Resp Status: room air  Cardiac: VS stable  Side Effects:None  Catheter Site:clean, dressing intact and dry  Cath type: peripheral nerve cath with ON Q  Infusion rate: 6ml/hr  Catheter Plan:Catheter to remain Insitu, Continue catheter infusion rate unchanged and Bolus Catheter  Comments: Pt reports pain deltoid/pecs.  Discussed ice, po meds and limitations for reblock , increased risk of injury d/t previous attempts times 2.  Anti anxiety meds short term may help, routine use of non opioids.  Will bolus pain cath with LA . Thank you.

## 2022-05-14 NOTE — PLAN OF CARE
Goal Outcome Evaluation:  Plan of Care Reviewed With: patient        Progress: no change     Patient is alert and oriented. Complaints of pain in left shoulder with interventions being partly effective. Patient has not slept all night despite interventions. Continent of void. V/S remain stable. Will continue to monitor.

## 2022-05-14 NOTE — THERAPY TREATMENT NOTE
Patient Name: Heidi Tomlinson  : 1935    MRN: [id number]                              Today's Date: 2022       Admit Date: 2022    Visit Dx: No diagnosis found.  Patient Active Problem List   Diagnosis   • Glenohumeral arthritis, left   • S/P reverse total shoulder arthroplasty, left   • Hypokalemia     Past Medical History:   Diagnosis Date   • Arthritis      Past Surgical History:   Procedure Laterality Date   • CHOLECYSTECTOMY     • COLONOSCOPY     • HIP ARTHROPLASTY Right    • HYSTERECTOMY      partial   • KNEE ARTHROPLASTY Left    • TONSILLECTOMY     • TOTAL SHOULDER ARTHROPLASTY W/ DISTAL CLAVICLE EXCISION Left 2022    Procedure: TOTAL SHOULDER REVERSE ARTHROPLASTY LEFT WITH SUBSCPULAR REPAIR;  Surgeon: Mushtaq Walsh MD;  Location: [facility];  Service: Orthopedics;  Laterality: Left;      General Information     Row Name 22 1351          Physical Therapy Time and Intention    Document Type therapy note (daily note)  -     Mode of Treatment physical therapy  -     Row Name 22 4996          General Information    Existing Precautions/Restrictions fall;non-weight bearing;left;shoulder;other (see comments)  LUE Sling, Interscalene Catheter  -KITA     Row Name 22 1358          Cognition    Orientation Status (Cognition) oriented x 4  -     Row Name 22 1354          Safety Issues, Functional Mobility    Safety Issues Affecting Function (Mobility) safety precaution awareness;safety precautions follow-through/compliance;awareness of need for assistance;insight into deficits/self-awareness;sequencing abilities  -     Impairments Affecting Function (Mobility) balance;strength;endurance/activity tolerance;pain;range of motion (ROM)  -           User Key  (r) = Recorded By, (t) = Taken By, (c) = Cosigned By    Initials Name Provider Type    KITA Clive Moise PT Physical Therapist               Mobility     Row Name 22 1359          Bed  Mobility    Bed Mobility scooting/bridging;supine-sit;sit-supine  -KITA     Scooting/Bridging [place] (Bed Mobility) minimum assist (75% patient effort);verbal cues  -KITA     Supine-Sit [place] (Bed Mobility) minimum assist (75% patient effort);verbal cues  -KITA     Sit-Supine [place] (Bed Mobility) verbal cues;minimum assist (75% patient effort)  -KITA     Assistive Device (Bed Mobility) bed rails;head of bed elevated  -KITA     Comment, (Bed Mobility) Min A for LE management off of/onto EOB and trunk control into sitting/supine  -KITA     Row Name 05/14/22 7895          Sit-Stand Transfer    Sit-Stand [place] (Transfers) minimum assist (75% patient effort);verbal cues  -KITA     Assistive Device (Sit-Stand Transfers) cane, straight  -KITA     Comment, (Sit-Stand Transfer) Verbal cues for safe hand placement during standing/sitting and maintaining NWB on L UE.  -KITA     Row Name 05/14/22 8591          Gait/Stairs (Locomotion)    [place] Level (Gait) verbal cues;contact guard  -KITA     Assistive Device (Gait) cane, straight  -KITA     Distance in Feet (Gait) 110  -KITA     Deviations/Abnormal Patterns (Gait) bilateral deviations;poornima decreased;gait speed decreased;stride length decreased  -KITA     Bilateral Gait Deviations forward flexed posture  -KITA     [place] Level (Stairs) not tested  -KITA     Comment, (Gait/Stairs) Pt ambulated with step through pattern and decreased speed. Verbal cues for maintaining upright posture, and AD management. Gait limited by fatigue and weakness.  -KITA     Row Name 05/14/22 9528          Mobility    Extremity Weight-bearing Status left upper extremity  -KITA     Left Upper Extremity (Weight-bearing Status) non weight-bearing (NWB)   -           User Key  (r) = Recorded By, (t) = Taken By, (c) = Cosigned By    Initials Name Provider Type    Clive Soliz PT Physical Therapist               Obj/Interventions     Row Name 05/14/22 9310          Balance    Balance Assessment  sit to stand dynamic balance;standing static balance;standing dynamic balance  -KITA     Sit to Stand Dynamic Balance verbal cues;contact guard  -KITA     Static Standing Balance verbal cues;contact guard  -KITA     Dynamic Standing Balance verbal cues;minimal assist  -KITA           User Key  (r) = Recorded By, (t) = Taken By, (c) = Cosigned By    Initials Name Provider Type    Clive Soliz, PT Physical Therapist               Goals/Plan    No documentation.                Clinical Impression     Row Name 05/14/22 1355          Pain    Pretreatment Pain Rating 0/10 - no pain  -KITA     Posttreatment Pain Rating 0/10 - no pain  -KITA     Row Name 05/14/22 1355          Therapy Assessment/Plan (PT)    Rehab Potential (PT) good, to achieve stated therapy goals  -     Criteria for Skilled Interventions Met (PT) yes;meets criteria;skilled treatment is necessary  -     Therapy Frequency (PT) daily  -     Row Name 05/14/22 1355          Vital Signs    Pre SpO2 (%) 97  -KITA     O2 Delivery Pre Treatment room air  -KITA     Intra SpO2 (%) 95  -KITA     O2 Delivery Intra Treatment room air  -KITA     Post SpO2 (%) 97  -KITA     O2 Delivery Post Treatment room air  -KITA     Pre Patient Position Supine  -KITA     Intra Patient Position Standing  -KITA     Post Patient Position Supine  -KITA     Row Name 05/14/22 1355          Positioning and Restraints    Pre-Treatment Position in bed  -KITA     Post Treatment Position bed  -KITA     In Bed notified nsg;call light within reach;encouraged to call for assist;exit alarm on  -KITA           User Key  (r) = Recorded By, (t) = Taken By, (c) = Cosigned By    Initials Name Provider Type    Clive Soliz, PT Physical Therapist               Outcome Measures     Row Name 05/14/22 135 05/14/22 0859       How much help from another person do you currently need...    Turning from your back to your side while in flat bed without using bedrails? 2  -KITA 4  -KB    Moving from lying on back to sitting on the side of a  flat bed without bedrails? 2  -KITA 4  -KB    Moving to and from a bed to a chair (including a wheelchair)? 3  -KITA 4  -KB    Standing up from a chair using your arms (e.g., wheelchair, bedside chair)? 3  -KITA 4  -KB    Climbing 3-5 steps with a railing? 2  -KITA 2  -KB    To walk in hospital room? 3  -KITA 3  -KB    AM-PAC 6 Clicks Score (PT) 15  -KITA 21  -KB    Highest level of mobility 4 --> Transferred to chair/commode  -KITA 6 --> Walked 10 steps or more  -KB    Row Name 05/14/22 1355          Functional Assessment    Outcome Measure Options AM-PAC 6 Clicks Basic Mobility (PT)  -KITA           User Key  (r) = Recorded By, (t) = Taken By, (c) = Cosigned By    Initials Name Provider Type    Clive Soliz, PT Physical Therapist    Vicenta Gomes, RN Registered Nurse                             Physical Therapy Education                 Title: PT OT SLP Therapies (Done)     Topic: Physical Therapy (Done)     Point: Mobility training (Done)     Learning Progress Summary           Patient Acceptance, E,D, VU by KITA at 5/14/2022 1355    Comment: Educated on safe sequencing with bed mobility, ambulatory transfers, and gait. Reviewed NWB precautions.    Acceptance, E,D, VU,NR by LR at 5/13/2022 1123    Comment: Educated on NWB status of L UE, precautions, safety with mobility, correct sit<->stand t/f technique, correct gait mechanics, benefits of frequent ambulation, and progression of POC.                   Point: Home exercise program (Done)     Learning Progress Summary           Patient Acceptance, E,D, VU by KITA at 5/14/2022 1355    Comment: Educated on safe sequencing with bed mobility, ambulatory transfers, and gait. Reviewed NWB precautions.    Acceptance, E,D, VU,NR by LR at 5/13/2022 1123    Comment: Educated on NWB status of L UE, precautions, safety with mobility, correct sit<->stand t/f technique, correct gait mechanics, benefits of frequent ambulation, and progression of POC.                   Point: Body mechanics  (Done)     Learning Progress Summary           Patient Acceptance, E,D, VU by KITA at 5/14/2022 1355    Comment: Educated on safe sequencing with bed mobility, ambulatory transfers, and gait. Reviewed NWB precautions.    Acceptance, E,D, VU,NR by LR at 5/13/2022 1123    Comment: Educated on NWB status of L UE, precautions, safety with mobility, correct sit<->stand t/f technique, correct gait mechanics, benefits of frequent ambulation, and progression of POC.                   Point: Precautions (Done)     Learning Progress Summary           Patient Acceptance, E,D, VU by KITA at 5/14/2022 1355    Comment: Educated on safe sequencing with bed mobility, ambulatory transfers, and gait. Reviewed NWB precautions.    Acceptance, E,D, VU,NR by LR at 5/13/2022 1123    Comment: Educated on NWB status of L UE, precautions, safety with mobility, correct sit<->stand t/f technique, correct gait mechanics, benefits of frequent ambulation, and progression of POC.                               User Key     Initials Effective Dates Name Provider Type Discipline    LR 06/16/21 -  Libertad Fairchild, PT Physical Therapist PT    KITA 06/16/21 -  Clive Moise, PT Physical Therapist PT              PT Recommendation and Plan     Plan of Care Reviewed With: patient  Progress: improving  Outcome Evaluation: Pt ambulated 110 feet using STC on the R and CGA. Gait limited by fatigue and weakness. Bed and STS performed with min A. Reviewed NWB precautions. Continue recommend pt d/c IPR when appropriate.     Time Calculation:    PT Charges     Row Name 05/14/22 2995             Time Calculation    Start Time 1355  -KITA      PT Received On 05/14/22  -      PT Goal Re-Cert Due Date 05/23/22  -              Time Calculation- PT    Total Timed Code Minutes- PT 23 minute(s)  -KITA              Timed Charges    02833 - Gait Training Minutes  23  -KITA              Total Minutes    Timed Charges Total Minutes 23  -KITA       Total Minutes 23  -KITA             User Key  (r) = Recorded By, (t) = Taken By, (c) = Cosigned By    Initials Name Provider Type    KITA Clive Moise, PT Physical Therapist              Therapy Charges for Today     Code Description Service Date Service Provider Modifiers Qty    51349427363 HC GAIT TRAINING EA 15 MIN 5/14/2022 Clive Moise, PT GP 2          PT G-Codes  Outcome Measure Options: AM-PAC 6 Clicks Basic Mobility (PT)  AM-PAC 6 Clicks Score (PT): 15  AM-PAC 6 Clicks Score (OT): 14    Clive Moise PT  5/14/2022

## 2022-05-14 NOTE — PROGRESS NOTES
[facility]    Acute pain service Inpatient Progress Note    Patient Name: Heidi Tomlinson  :  1935  MRN:  [id number]        Acute Pain  Service Inpatient Progress Note:    Analgesia:Fair  LOC: alert and awake  Resp Status: room air  Cardiac: VS stable  Side Effects:None  Catheter Site:clean, dressing intact and dry  Cath type: peripheral nerve cath with ON Q  Infusion rate: 8ml/hr  Catheter Plan:Catheter to remain Insitu, Catheter infusion rate increased 4ml/hr for 2 hrs, then resume at and Continue catheter infusion rate unchanged  Comments: Pt c/o of deltoid/incisional pain, will increase for 2 hours per order, rn notified.  Thank you

## 2022-05-14 NOTE — THERAPY TREATMENT NOTE
Patient Name: Heidi Tomlinson  : 1935    MRN: [id number]                              Today's Date: 2022       Admit Date: 2022    Visit Dx: No diagnosis found.  Patient Active Problem List   Diagnosis   • Glenohumeral arthritis, left   • S/P reverse total shoulder arthroplasty, left   • Hypokalemia     Past Medical History:   Diagnosis Date   • Arthritis      Past Surgical History:   Procedure Laterality Date   • CHOLECYSTECTOMY     • COLONOSCOPY     • HIP ARTHROPLASTY Right    • HYSTERECTOMY      partial   • KNEE ARTHROPLASTY Left    • TONSILLECTOMY     • TOTAL SHOULDER ARTHROPLASTY W/ DISTAL CLAVICLE EXCISION Left 2022    Procedure: TOTAL SHOULDER REVERSE ARTHROPLASTY LEFT WITH SUBSCPULAR REPAIR;  Surgeon: Mushtaq Walsh MD;  Location: [facility];  Service: Orthopedics;  Laterality: Left;      General Information     Row Name 22 1607          OT Time and Intention    Document Type therapy note (daily note)  -AR     Mode of Treatment individual therapy;occupational therapy  -AR     Row Name 22 1607          General Information    Existing Precautions/Restrictions fall;non-weight bearing;left;shoulder;other (see comments)  Grant interscalene  -AR     Row Name 22 160          Cognition    Orientation Status (Cognition) oriented x 4  -AR     Row Name 22 1607          Safety Issues, Functional Mobility    Safety Issues Affecting Function (Mobility) safety precaution awareness;safety precautions follow-through/compliance  -AR     Impairments Affecting Function (Mobility) pain;endurance/activity tolerance;strength  -AR           User Key  (r) = Recorded By, (t) = Taken By, (c) = Cosigned By    Initials Name Provider Type    AR Deya Quan OT Occupational Therapist                 Mobility/ADL's     Row Name 22 1608          Bed Mobility    Comment, (Bed Mobility) Reviewed importance of maintaining NWB RUE AAT, reivewed safe sleeping  position. She declined OOB activity  -AR     Row Name 05/14/22 1608          Transfers    Comment, (Transfers) pt declined OOB activity  -AR     Row Name 05/14/22 1608          Activities of Daily Living    BADL Assessment/Intervention bathing;upper body dressing  -AR     Row Name 05/14/22 1608          Mobility    Extremity Weight-bearing Status left upper extremity  -AR     Left Upper Extremity (Weight-bearing Status) non weight-bearing (NWB)  -AR     Row Name 05/14/22 1608          Upper Body Dressing Assessment/Training    [place] Level (Upper Body Dressing) don;doff;dependent (less than 25% patient effort)  sling  -AR     Position (Upper Body Dressing) supine  -AR     Comment, (Upper Body Dressing) Reviewed R shoulder precautions, ADL retraining to maintain, sling management and care of interscalene during ADLs to avoid dislodgement. No family at bedside for review.  -AR     Row Name 05/14/22 1608          Bathing Assessment/Intervention    Comment, (Bathing) Reviewed RUE shoulder precautions and R axilla care to maintain  -AR           User Key  (r) = Recorded By, (t) = Taken By, (c) = Cosigned By    Initials Name Provider Type    Deya Oconnor, OT Occupational Therapist               Obj/Interventions     Row Name 05/14/22 1612          Sensory Assessment (Somatosensory)    Sensory Assessment (Somatosensory) UE sensation intact  -AR     [facility] Name 05/14/22 1612          Elbow/Forearm (Therapeutic Exercise)    Elbow/Forearm (Therapeutic Exercise) AROM (active range of motion)  -AR     Elbow/Forearm AROM (Therapeutic Exercise) left;extension;flexion;supination;pronation;supine;10 repetitions  -AR     Row Name 05/14/22 1612          Wrist (Therapeutic Exercise)    Wrist (Therapeutic Exercise) AROM (active range of motion)  -AR     Wrist AROM (Therapeutic Exercise) left;extension;flexion;10 repetitions  -AR     Row Name 05/14/22 1612          Hand (Therapeutic Exercise)    Hand (Therapeutic Exercise)  AROM (active range of motion)  -AR     Hand AROM/AAROM (Therapeutic Exercise) left;finger extension;finger flexion;10 repetitions  -AR     Row Name 05/14/22 1612          Motor Skills    Therapeutic Exercise elbow/forearm;wrist;hand  -AR     Row Name 05/14/22 1612          Balance    Balance Assessment sitting static balance;sitting dynamic balance  -AR     Static Sitting Balance supervision  -AR           User Key  (r) = Recorded By, (t) = Taken By, (c) = Cosigned By    Initials Name Provider Type    Deya Oconnor, GET Occupational Therapist               Goals/Plan    No documentation.                Clinical Impression     Row Name 05/14/22 1613          Pain Assessment    Pretreatment Pain Rating 7/10  -AR     Posttreatment Pain Rating 7/10  -AR     Pain Location - Side/Orientation Left  -AR     Pain Location anterior  -AR     Pain Location - shoulder  -AR     Pre/Posttreatment Pain Comment she declined cold pack, RN notified of pt pain level and that pt tearful  -AR     Pain Intervention(s) Medication (See MAR);Repositioned  -AR     Row Name 05/14/22 1613          Plan of Care Review    Plan of Care Reviewed With patient  -AR     Progress declining  -AR     Outcome Evaluation OT reviewed L shoulder precautions, ADL retraining to maintain, sling management and LUE HEP. Pt tearful with c/o - nurse notified. She declined further OOB activity following PT session. Recommend IPR.  -AR     Row Name 05/14/22 1613          Therapy Plan Review/Discharge Plan (OT)    Anticipated Discharge Disposition (OT) inpatient rehabilitation facility  -AR     Row Name 05/14/22 1613          Vital Signs    Pre Patient Position Supine  -AR     Intra Patient Position Supine  -AR     Post Patient Position Supine  -AR     Row Name 05/14/22 1613          Positioning and Restraints    Pre-Treatment Position in bed  -AR     Post Treatment Position bed  -AR     In Bed supine;call light within reach;encouraged to call for assist;exit  alarm on;with brace;with nsg  -AR           User Key  (r) = Recorded By, (t) = Taken By, (c) = Cosigned By    Initials Name Provider Type    Deya Oconnor OT Occupational Therapist               Outcome Measures     Row Name 05/14/22 1615          How much help from another is currently needed...    Putting on and taking off regular lower body clothing? 2  -AR     Bathing (including washing, rinsing, and drying) 2  -AR     Toileting (which includes using toilet bed pan or urinal) 2  -AR     Putting on and taking off regular upper body clothing 2  -AR     Taking care of personal grooming (such as brushing teeth) 3  -AR     Eating meals 3  -AR     AM-PAC 6 Clicks Score (OT) 14  -AR     Row Name 05/14/22 1355 05/14/22 0859       How much help from another person do you currently need...    Turning from your back to your side while in flat bed without using bedrails? 2  -KITA 4  -KB    Moving from lying on back to sitting on the side of a flat bed without bedrails? 2  -KITA 4  -KB    Moving to and from a bed to a chair (including a wheelchair)? 3  -KITA 4  -KB    Standing up from a chair using your arms (e.g., wheelchair, bedside chair)? 3  -KITA 4  -KB    Climbing 3-5 steps with a railing? 2  -KITA 2  -KB    To walk in hospital room? 3  -KITA 3  -KB    AM-PAC 6 Clicks Score (PT) 15  -KITA 21  -KB    Highest level of mobility 4 --> Transferred to chair/commode  -KITA 6 --> Walked 10 steps or more  -KB    Row Name 05/14/22 1615 05/14/22 1355       Functional Assessment    Outcome Measure Options AM-PAC 6 Clicks Daily Activity (OT)  -AR AM-PAC 6 Clicks Basic Mobility (PT)  -KITA          User Key  (r) = Recorded By, (t) = Taken By, (c) = Cosigned By    Initials Name Provider Type    Deya Oconnor OT Occupational Therapist    Clive Soliz, PT Physical Therapist    Vicenta Gomes, RN Registered Nurse                Occupational Therapy Education                 Title: PT OT SLP Therapies (Done)     Topic:  Occupational Therapy (Done)     Point: ADL training (Done)     Description:   Instruct learner(s) on proper safety adaptation and remediation techniques during self care or transfers.   Instruct in proper use of assistive devices.              Learning Progress Summary           Patient Eager, E,TB,D, VU,NR by AR at 5/14/2022 1615    Acceptance, E,D, NR,VU by  at 5/13/2022 1333    Acceptance, E,TB,D, VU,NR by HK at 5/12/2022 1432   Family Acceptance, E,D, NR,VU by  at 5/13/2022 1333                   Point: Home exercise program (Done)     Description:   Instruct learner(s) on appropriate technique for monitoring, assisting and/or progressing therapeutic exercises/activities.              Learning Progress Summary           Patient Eager, E,TB,D, VU,NR by AR at 5/14/2022 1615    Acceptance, E,D, NR,VU by  at 5/13/2022 1333    Acceptance, E,TB,D, VU,NR by HK at 5/12/2022 1432   Family Acceptance, E,D, NR,VU by  at 5/13/2022 1333                   Point: Precautions (Done)     Description:   Instruct learner(s) on prescribed precautions during self-care and functional transfers.              Learning Progress Summary           Patient Eager, E,TB,D, VU,NR by AR at 5/14/2022 1615    Acceptance, E,D, NR,VU by  at 5/13/2022 1333    Acceptance, E,TB,D, VU,NR by HK at 5/12/2022 1432   Family Acceptance, E,D, NR,VU by  at 5/13/2022 1333                   Point: Body mechanics (Done)     Description:   Instruct learner(s) on proper positioning and spine alignment during self-care, functional mobility activities and/or exercises.              Learning Progress Summary           Patient Eager, E,TB,D, VU,NR by AR at 5/14/2022 1615    Acceptance, E,D, NR,VU by  at 5/13/2022 1333    Acceptance, E,TB,D, VU,NR by  at 5/12/2022 1432   Family Acceptance, E,D, NR,VU by  at 5/13/2022 1333                               User Key     Initials Effective Dates Name Provider Type Discipline    AR 06/16/21 -  Deya Quan  Claudette OT Occupational Therapist OT     06/16/21 -  Huong Luna OT Occupational Therapist OT     06/16/21 -  Libertad Caro OT Occupational Therapist OT              OT Recommendation and Plan     Plan of Care Review  Plan of Care Reviewed With: patient  Progress: declining  Outcome Evaluation: OT reviewed L shoulder precautions, ADL retraining to maintain, sling management and LUE HEP. Pt tearful with c/o - nurse notified. She declined further OOB activity following PT session. Recommend IPR.     Time Calculation:    Time Calculation- OT     Row Name 05/14/22 1616 05/14/22 1355          Time Calculation- OT    OT Start Time 1436  -AR --     OT Received On 05/14/22  -AR --     OT Goal Re-Cert Due Date 05/22/22  -AR --            Timed Charges    22024 - OT Therapeutic Exercise Minutes 10  -AR --     08822 - Gait Training Minutes  -- 23  -KITA     92904 - OT Self Care/Mgmt Minutes 18  -AR --            Total Minutes    Timed Charges Total Minutes 28  -AR 23  -KITA      Total Minutes 28  -AR 23  -KITA           User Key  (r) = Recorded By, (t) = Taken By, (c) = Cosigned By    Initials Name Provider Type    AR Deya Quan OT Occupational Therapist    KITA Clive Moise, STEFAN Physical Therapist              Therapy Charges for Today     Code Description Service Date Service Provider Modifiers Qty    98603014808 HC OT SELF CARE/MGMT/TRAIN EA 15 MIN 5/14/2022 Deya Quan OT GO 1    02870608032 HC OT THER PROC EA 15 MIN 5/14/2022 Deya Quan OT GO 1               Deya Quan OT  5/14/2022

## 2022-05-14 NOTE — PLAN OF CARE
Patient is AxO x4. VSS, besides BP being slightly elevated. PRN pain and anxiety meds given around the clock. Magnesium and potassium replaced. Ropivacaine going at 6 ml/hr. RA. Worked with PT. Up with 1, cane and gait belt used. Sling in place. Will continue to monitor.

## 2022-05-14 NOTE — PLAN OF CARE
Problem: Adult Inpatient Plan of Care  Goal: Plan of Care Review  Flowsheets (Taken 5/14/2022 3656)  Progress: improving  Plan of Care Reviewed With: patient  Outcome Evaluation: Pt ambulated 110 feet using STC on the R and CGA. Gait limited by fatigue and weakness. Bed and STS performed with min A. Reviewed NWB precautions. Continue recommend pt d/c IPR when appropriate.   Goal Outcome Evaluation:  Plan of Care Reviewed With: patient        Progress: improving  Outcome Evaluation: Pt ambulated 110 feet using STC on the R and CGA. Gait limited by fatigue and weakness. Bed and STS performed with min A. Reviewed NWB precautions. Continue recommend pt d/c IPR when appropriate.

## 2022-05-14 NOTE — PROGRESS NOTES
Orthopedic Daily Progress Note      CC: L reverse TSA    Pain adequately controlled  General: no fevers, chills  Abdomen: no nausea, vomiting, or diarrhea    No other complaints    Physical Exam:  I have reviewed the vital signs.  Temp:  [98.3 °F (36.8 °C)-99 °F (37.2 °C)] 98.5 °F (36.9 °C)  Heart Rate:  [] 98  Resp:  [16] 16  BP: (149-187)/(62-91) 155/83    Objective  General Appearance:    Alert, cooperative, no distress  Extremities: No clubbing, cyanosis, or edema to lower extremities  Pulses:  2+ in distal surgical extremity  Skin: Dressing Clean/dry/intact      Results Review:    I have reviewed the labs, radiology results and diagnostic studies:    Results from last 7 days   Lab Units 05/13/22  0528   WBC 10*3/mm3 7.68   HEMOGLOBIN g/dL 7.9*   PLATELETS 10*3/mm3 328     Results from last 7 days   Lab Units 05/13/22  0528   SODIUM mmol/L 136   POTASSIUM mmol/L 3.2*   CO2 mmol/L 26.0   CREATININE mg/dL 0.46*   GLUCOSE mg/dL 113*       I have reviewed the medications.    Assessment/Problem List  POD# 2 Day Post-Op   S/p L Reverse TSA     Plan  1) sling  2) OT- to instruct on ADLs, and elbow wrist and hand ROM  3) dressing to remain intact until followup  4) po pain meds, ISB block      Discharge Planning: I expect patient to be discharged to rehab when bed available/eligible    Mushtaq Walsh MD  05/14/22  08:56 EDT

## 2022-05-15 PROBLEM — E83.42 HYPOMAGNESEMIA: Status: ACTIVE | Noted: 2022-05-15

## 2022-05-15 PROBLEM — G89.18 POSTOPERATIVE PAIN: Status: ACTIVE | Noted: 2022-05-15

## 2022-05-15 LAB — MAGNESIUM SERPL-MCNC: 2 MG/DL (ref 1.6–2.4)

## 2022-05-15 PROCEDURE — 97535 SELF CARE MNGMENT TRAINING: CPT | Performed by: OCCUPATIONAL THERAPIST

## 2022-05-15 PROCEDURE — 97110 THERAPEUTIC EXERCISES: CPT

## 2022-05-15 PROCEDURE — 97116 GAIT TRAINING THERAPY: CPT

## 2022-05-15 PROCEDURE — 63710000001 HYDROXYZINE 25 MG TABLET: Performed by: INTERNAL MEDICINE

## 2022-05-15 PROCEDURE — A9270 NON-COVERED ITEM OR SERVICE: HCPCS | Performed by: NURSE ANESTHETIST, CERTIFIED REGISTERED

## 2022-05-15 PROCEDURE — 83735 ASSAY OF MAGNESIUM: CPT | Performed by: INTERNAL MEDICINE

## 2022-05-15 PROCEDURE — A9270 NON-COVERED ITEM OR SERVICE: HCPCS | Performed by: ORTHOPAEDIC SURGERY

## 2022-05-15 PROCEDURE — 63710000001 ACETAMINOPHEN 500 MG TABLET: Performed by: NURSE ANESTHETIST, CERTIFIED REGISTERED

## 2022-05-15 PROCEDURE — 63710000001 OXYCODONE 5 MG TABLET: Performed by: ORTHOPAEDIC SURGERY

## 2022-05-15 PROCEDURE — A9270 NON-COVERED ITEM OR SERVICE: HCPCS | Performed by: INTERNAL MEDICINE

## 2022-05-15 RX ORDER — HYDROXYZINE HYDROCHLORIDE 25 MG/1
25 TABLET, FILM COATED ORAL EVERY 6 HOURS PRN
Start: 2022-05-15

## 2022-05-15 RX ORDER — OXYCODONE HYDROCHLORIDE 5 MG/1
5 TABLET ORAL EVERY 4 HOURS PRN
Refills: 0
Start: 2022-05-15 | End: 2022-05-16 | Stop reason: SDUPTHER

## 2022-05-15 RX ADMIN — OXYCODONE 10 MG: 5 TABLET ORAL at 15:57

## 2022-05-15 RX ADMIN — OXYCODONE 10 MG: 5 TABLET ORAL at 02:06

## 2022-05-15 RX ADMIN — ACETAMINOPHEN 1000 MG: 500 TABLET ORAL at 22:22

## 2022-05-15 RX ADMIN — OXYCODONE 10 MG: 5 TABLET ORAL at 06:07

## 2022-05-15 RX ADMIN — HYDROXYZINE HYDROCHLORIDE 25 MG: 25 TABLET, FILM COATED ORAL at 02:07

## 2022-05-15 NOTE — THERAPY TREATMENT NOTE
Patient Name: Heidi Tomlinson  : 1935    MRN: [id number]                              Today's Date: 5/15/2022       Admit Date: 2022    Visit Dx: No diagnosis found.  Patient Active Problem List   Diagnosis   • Glenohumeral arthritis, left   • S/P reverse total shoulder arthroplasty, left   • Hypokalemia     Past Medical History:   Diagnosis Date   • Arthritis      Past Surgical History:   Procedure Laterality Date   • CHOLECYSTECTOMY     • COLONOSCOPY     • HIP ARTHROPLASTY Right    • HYSTERECTOMY      partial   • KNEE ARTHROPLASTY Left    • TONSILLECTOMY     • TOTAL SHOULDER ARTHROPLASTY W/ DISTAL CLAVICLE EXCISION Left 2022    Procedure: TOTAL SHOULDER REVERSE ARTHROPLASTY LEFT WITH SUBSCPULAR REPAIR;  Surgeon: Mushtaq Walsh MD;  Location: [facility];  Service: Orthopedics;  Laterality: Left;      General Information     Row Name 05/15/22 1647          OT Time and Intention    Document Type therapy note (daily note)  -AR     Mode of Treatment occupational therapy  -AR     Row Name 05/15/22 1647          General Information    Existing Precautions/Restrictions fall;non-weight bearing;left;shoulder;other (see comments)  interscalene nerve catheter, [place]  -AR     Row Name 05/15/22 1647          Cognition    Orientation Status (Cognition) oriented x 4  -AR     Row Name 05/15/22 1647          Safety Issues, Functional Mobility    Safety Issues Affecting Function (Mobility) safety precaution awareness;safety precautions follow-through/compliance;sequencing abilities  -AR     Impairments Affecting Function (Mobility) pain;endurance/activity tolerance;strength  -AR           User Key  (r) = Recorded By, (t) = Taken By, (c) = Cosigned By    Initials Name Provider Type    AR Deya Quan OT Occupational Therapist                 Mobility/ADL's     Row Name 05/15/22 1648          Bed Mobility    Bed Mobility scooting/bridging;supine-sit  -AR     Scooting/Bridging  [place] (Bed Mobility) contact guard;verbal cues  -AR     Supine-Sit [place] (Bed Mobility) minimum assist (75% patient effort);verbal cues  -AR     Bed Mobility, Safety Issues decreased use of arms for pushing/pulling  -AR     Assistive Device (Bed Mobility) bed rails;head of bed elevated  -AR     Row Name 05/15/22 1648          Transfers    Transfers sit-stand transfer  -AR     Bed-Chair [place] (Transfers) contact guard;verbal cues  -AR     Assistive Device (Bed-Chair Transfers) cane, straight  -AR     Sit-Stand [place] (Transfers) contact guard;verbal cues  -AR     Row Name 05/15/22 1648          Sit-Stand Transfer    Assistive Device (Sit-Stand Transfers) cane, straight  -AR     Row Name 05/15/22 1648          Activities of Daily Living    BADL Assessment/Intervention lower body dressing;upper body dressing  -AR     Row Name 05/15/22 1648          Mobility    Extremity Weight-bearing Status left upper extremity  -AR     Left Upper Extremity (Weight-bearing Status) non weight-bearing (NWB)  -AR     Row Name 05/15/22 1648          Lower Body Dressing Assessment/Training    [place] Level (Lower Body Dressing) don;pants/bottoms;moderate assist (50% patient effort)  -AR     Position (Lower Body Dressing) edge of bed sitting;supported standing  -AR     Row Name 05/15/22 1648          Upper Body Dressing Assessment/Training    [place] Level (Upper Body Dressing) don;doff;dependent (less than 25% patient effort)  sling management  -AR     Position (Upper Body Dressing) edge of bed sitting  -AR           User Key  (r) = Recorded By, (t) = Taken By, (c) = Cosigned By    Initials Name Provider Type    Deya Oconnor, OT Occupational Therapist               Obj/Interventions     Row Name 05/15/22 1649          Elbow/Forearm (Therapeutic Exercise)    Elbow/Forearm (Therapeutic Exercise) AROM (active range of motion)  -AR     Elbow/Forearm AROM (Therapeutic Exercise)  left;flexion;extension;supination;pronation;supine;10 repetitions  -AR     Row Name 05/15/22 1649          Wrist (Therapeutic Exercise)    Wrist (Therapeutic Exercise) AROM (active range of motion)  -AR     Wrist AROM (Therapeutic Exercise) left;extension;flexion;10 repetitions  -AR     Row Name 05/15/22 1649          Hand (Therapeutic Exercise)    Hand AROM/AAROM (Therapeutic Exercise) left;AROM (active range of motion);finger flexion;finger extension;10 repetitions  -AR     Row Name 05/15/22 1649          Motor Skills    Therapeutic Exercise elbow/forearm;wrist;hand  -AR     Row Name 05/15/22 1649          Balance    Balance Assessment sitting static balance;sitting dynamic balance;standing static balance;standing dynamic balance  -AR     Static Sitting Balance supervision  -AR     Dynamic Sitting Balance supervision  -AR     Position, Sitting Balance sitting edge of bed  -AR     Static Standing Balance contact guard;verbal cues  -AR     Dynamic Standing Balance contact guard;verbal cues  -AR     Position/Device Used, Standing Balance cane, straight  -AR           User Key  (r) = Recorded By, (t) = Taken By, (c) = Cosigned By    Initials Name Provider Type    Deya Oconnor, OT Occupational Therapist               Goals/Plan    No documentation.                Clinical Impression     Row Name 05/15/22 1650          Pain Assessment    Pain Intervention(s) Medication (See MAR);Cold applied;Repositioned;Ambulation/increased activity  -AR     Additional Documentation Pain Scale: FACES Pre/Post-Treatment (Group)  -AR     Row Name 05/15/22 1650          Pain Scale: FACES Pre/Post-Treatment    Pain: FACES Scale, Pretreatment 2-->hurts little bit  -AR     Posttreatment Pain Rating 2-->hurts little bit  -AR     Pain Location - Side/Orientation Left  -AR     Pain Location generalized  -AR     Pain Location - shoulder  -AR     Row Name 05/15/22 1650          Plan of Care Review    Plan of Care Reviewed With  patient;daughter  -AR     Outcome Evaluation Pt completed bed mobility with min assist, LB dressing with mod assist and sling management with dependence. She completed transfer training with CGA. Recommend IPR.  -AR     Row Name 05/15/22 1650          Therapy Plan Review/Discharge Plan (OT)    Anticipated Discharge Disposition (OT) inpatient rehabilitation facility  -AR     Row Name 05/15/22 1650          Vital Signs    Pre Patient Position Supine  -AR     Intra Patient Position Standing  -AR     Post Patient Position Standing  -AR     Row Name 05/15/22 1650          Positioning and Restraints    Pre-Treatment Position in bed  -AR     Post Treatment Position other  with PT  -AR     Other Position with PT  -AR           User Key  (r) = Recorded By, (t) = Taken By, (c) = Cosigned By    Initials Name Provider Type    Deya Oconnor, OT Occupational Therapist               Outcome Measures     Row Name 05/15/22 1652          How much help from another is currently needed...    Putting on and taking off regular lower body clothing? 2  -AR     Bathing (including washing, rinsing, and drying) 2  -AR     Toileting (which includes using toilet bed pan or urinal) 2  -AR     Putting on and taking off regular upper body clothing 2  -AR     Taking care of personal grooming (such as brushing teeth) 3  -AR     Eating meals 3  -AR     AM-PAC 6 Clicks Score (OT) 14  -AR     Row Name 05/15/22 0800          How much help from another person do you currently need...    Turning from your back to your side while in flat bed without using bedrails? 4  -GJ     Moving from lying on back to sitting on the side of a flat bed without bedrails? 3  -GJ     Moving to and from a bed to a chair (including a wheelchair)? 3  -GJ     Standing up from a chair using your arms (e.g., wheelchair, bedside chair)? 3  -GJ     Climbing 3-5 steps with a railing? 3  -GJ     To walk in hospital room? 3  -GJ     AM-PAC 6 Clicks Score (PT) 19  -GJ      Highest level of mobility 6 --> Walked 10 steps or more  -RITO     Row Name 05/15/22 1652          Functional Assessment    Outcome Measure Options AM-PAC 6 Clicks Daily Activity (OT)  -AR           User Key  (r) = Recorded By, (t) = Taken By, (c) = Cosigned By    Initials Name Provider Type    AR Deya Quan OT Occupational Therapist    Waleska Rincon, RN Registered Nurse                Occupational Therapy Education                 Title: PT OT SLP Therapies (Done)     Topic: Occupational Therapy (Done)     Point: ADL training (Done)     Description:   Instruct learner(s) on proper safety adaptation and remediation techniques during self care or transfers.   Instruct in proper use of assistive devices.              Learning Progress Summary           Patient Eager, E,D, VU,NR by AR at 5/15/2022 1652    Eager, E,TB,D, VU,NR by AR at 5/14/2022 1615    Acceptance, E,D, NR,VU by  at 5/13/2022 1333    Acceptance, E,TB,D, VU,NR by  at 5/12/2022 1432   Family Eager, E,D, VU,NR by AR at 5/15/2022 1652    Acceptance, E,D, NR,VU by  at 5/13/2022 1333                   Point: Home exercise program (Done)     Description:   Instruct learner(s) on appropriate technique for monitoring, assisting and/or progressing therapeutic exercises/activities.              Learning Progress Summary           Patient Eager, E,D, VU,NR by AR at 5/15/2022 1652    Eager, E,TB,D, VU,NR by AR at 5/14/2022 1615    Acceptance, E,D, NR,VU by  at 5/13/2022 1333    Acceptance, E,TB,D, VU,NR by  at 5/12/2022 1432   Family Eager, E,D, VU,NR by AR at 5/15/2022 1652    Acceptance, E,D, NR,VU by  at 5/13/2022 1333                   Point: Precautions (Done)     Description:   Instruct learner(s) on prescribed precautions during self-care and functional transfers.              Learning Progress Summary           Patient Eager, E,D, VU,NR by AR at 5/15/2022 1652    Eager, E,TB,D, VU,NR by AR at 5/14/2022 1615    Acceptance, E,D, NR,VU  by  at 5/13/2022 1333    Acceptance, E,TB,D, VU,NR by HK at 5/12/2022 1432   Family Eager, E,D, VU,NR by AR at 5/15/2022 1652    Acceptance, E,D, NR,VU by  at 5/13/2022 1333                   Point: Body mechanics (Done)     Description:   Instruct learner(s) on proper positioning and spine alignment during self-care, functional mobility activities and/or exercises.              Learning Progress Summary           Patient Eager, E,D, VU,NR by AR at 5/15/2022 1652    Eager, E,TB,D, VU,NR by AR at 5/14/2022 1615    Acceptance, E,D, NR,VU by  at 5/13/2022 1333    Acceptance, E,TB,D, VU,NR by  at 5/12/2022 1432   Family Eager, E,D, VU,NR by AR at 5/15/2022 1652    Acceptance, E,D, NR,VU by  at 5/13/2022 1333                               User Key     Initials Effective Dates Name Provider Type Discipline    AR 06/16/21 -  Dyea Quan, OT Occupational Therapist OT     06/16/21 -  Huong Luna OT Occupational Therapist OT     06/16/21 -  Libertad Caro OT Occupational Therapist OT              OT Recommendation and Plan     Plan of Care Review  Plan of Care Reviewed With: patient, daughter  Progress: declining  Outcome Evaluation: Pt completed bed mobility with min assist, LB dressing with mod assist and sling management with dependence. She completed transfer training with CGA. Recommend IPR.     Time Calculation:    Time Calculation- OT     Row Name 05/15/22 1653             Time Calculation- OT    OT Start Time 1631  -AR      OT Received On 05/15/22  -AR      OT Goal Re-Cert Due Date 05/22/22  -AR              Timed Charges    38956 - OT Therapeutic Exercise Minutes 8  -AR      04604 - OT Self Care/Mgmt Minutes 10  -AR              Total Minutes    Timed Charges Total Minutes 18  -AR       Total Minutes 18  -AR            User Key  (r) = Recorded By, (t) = Taken By, (c) = Cosigned By    Initials Name Provider Type    AR Deya Quan, OT Occupational Therapist              Therapy Charges  for Today     Code Description Service Date Service Provider Modifiers Qty    14634499055 HC OT SELF CARE/MGMT/TRAIN EA 15 MIN 5/14/2022 Deya Quna OT GO 1    01960085671 HC OT THER PROC EA 15 MIN 5/14/2022 Deya Quan OT GO 1    37619329792 HC OT SELF CARE/MGMT/TRAIN EA 15 MIN 5/15/2022 Deya Quan OT GO 1               Deya Quan OT  5/15/2022

## 2022-05-15 NOTE — PLAN OF CARE
Goal Outcome Evaluation:  Plan of Care Reviewed With: patient, daughter        Progress: improving  Outcome Evaluation: Pt. able to amb 160 ft w/ SPC R hand & CGA, +performed LE ther exer UIC per HEP, but limited by L shldr pain 5 to 6/10 despite premed. , & BP init elev 155/75 but decr 105/67 w/ mobil.

## 2022-05-15 NOTE — PROGRESS NOTES
"IM progress note      Hiedi Tomlinson  [id number]  1935     LOS: 0 days     Attending: Mushtaq Walsh MD    Primary Care Provider: Alejo Negron MD      Chief Complaint/Reason for visit:  No chief complaint on file.      Subjective   Good pain control this am. No new problems.   Objective        Visit Vitals  /69 (BP Location: Right arm, Patient Position: Lying)   Pulse 80   Temp 98.3 °F (36.8 °C) (Oral)   Resp 16   Ht 160 cm (63\")   Wt 62.6 kg (138 lb)   SpO2 94%   BMI 24.45 kg/m²     Temp (24hrs), Av.2 °F (36.8 °C), Min:97.9 °F (36.6 °C), Max:98.4 °F (36.9 °C)      Intake/Output:  No intake or output data in the 24 hours ending 05/15/22 1020     OT: 22;  Plan of Care Reviewed With: patient  Progress: declining  Outcome Evaluation: OT reviewed L shoulder precautions, ADL retraining to maintain, sling management and LUE HEP. Pt tearful with c/o - nurse notified. She declined further OOB activity following PT session. Recommend IPR.    Physical Exam:     General Appearance:    Alert, cooperative, in no acute distress   Head:    Normocephalic, without obvious abnormality, atraumatic    Lungs:     Normal effort, symmetric chest rise,  clear to      auscultation bilaterally              Heart:    Regular rhythm and normal rate, normal S1 and S2    Abdomen:     Normal bowel sounds, no masses, no organomegaly, soft        non-tender, non-distended, no guarding, no rebound                tenderness   Extremities:  Left upper extremity in a sling.  Clean dry intact Aquacel dressing.  Peripheral nerve block catheter present.  Moves her wrist and hand without difficulty.  Cap refill distally intact.  No clubbing, cyanosis or edema.  No deformities.    Pulses:   Pulses palpable and equal bilaterally   Skin:   No bleeding, bruising or rash          Results Review:     I reviewed the patient's new clinical results.   Results from last 7 days   Lab Units 22  1328 22  0528 " 05/10/22  0827   WBC 10*3/mm3  --  7.68 5.81   HEMOGLOBIN g/dL 9.1* 7.9* 10.2*   HEMATOCRIT % 28.2* 24.4* 32.9*   PLATELETS 10*3/mm3  --  328 407     Results from last 7 days   Lab Units 05/14/22  1105 05/13/22  0528 05/10/22  0827   SODIUM mmol/L  --  136 136   POTASSIUM mmol/L 3.4* 3.2* 3.5   CHLORIDE mmol/L  --  101 97*   CO2 mmol/L  --  26.0 27.0   BUN mg/dL  --  8 15   CREATININE mg/dL  --  0.46* 0.55*   CALCIUM mg/dL  --  8.4* 9.5   BILIRUBIN mg/dL  --   --  0.3   ALK PHOS U/L  --   --  95   ALT (SGPT) U/L  --   --  11   AST (SGOT) U/L  --   --  20   GLUCOSE mg/dL  --  113* 106*     I reviewed the patient's new imaging including images and reports.    All medications reviewed.   acetaminophen, 1,000 mg, Oral, Q12H      HYDROmorphone, 0.5 mg, Q2H PRN   And  naloxone, 0.1 mg, Q5 Min PRN  hydrOXYzine, 25 mg, Q6H PRN  labetalol, 10 mg, Q4H PRN  magnesium sulfate, 2 g, PRN   Or  magnesium sulfate, 2 g, PRN   Or  magnesium sulfate, 4 g, PRN  melatonin, 5 mg, Nightly PRN  ondansetron, 4 mg, Q6H PRN   Or  ondansetron, 4 mg, Q6H PRN  oxyCODONE, 10 mg, Q4H PRN  oxyCODONE, 5 mg, Q4H PRN  potassium chloride, 40 mEq, PRN  potassium chloride, 40 mEq, PRN  sodium chloride, 500 mL, TID PRN        Assessment & Plan       S/P reverse total shoulder arthroplasty, left    Glenohumeral arthritis, left    Hypokalemia    Hypokalemia, replaced  Acute blood loss anemia, monitor, no transfusion anticipated.     Plan   1. PT/OT. NWB, left UE, ROM hand, wrist, elbow.  2. Pain control-prns, interscalene nerve block cath with ropivacaine infusion.( Pump near empty likely out later today 5/15)           3. IS-encourage  4. DVT proph- Mech/ mobilize.  5. Bowel regimen  6. Resume home medications as appropriate  7. Monitor post-op labs    Potassium and magnesium replacement per protocol.    D/w pt and RN.  DC planning.  Inpatient rehab recommended.         Vicki Scott MD  05/15/22  10:20 EDT

## 2022-05-15 NOTE — PLAN OF CARE
Goal Outcome Evaluation:  Plan of Care Reviewed With: patient, daughter        Progress: declining  Outcome Evaluation: Pt completed bed mobility with min assist, LB dressing with mod assist and sling management with dependence. She completed transfer training with CGA. Recommend IPR.

## 2022-05-15 NOTE — THERAPY TREATMENT NOTE
Patient Name: Heidi Tomlinson  : 1935    MRN: [id number]                              Today's Date: 5/15/2022       Admit Date: 2022    Visit Dx: No diagnosis found.  Patient Active Problem List   Diagnosis   • Glenohumeral arthritis, left   • S/P reverse total shoulder arthroplasty, left   • Hypokalemia     Past Medical History:   Diagnosis Date   • Arthritis      Past Surgical History:   Procedure Laterality Date   • CHOLECYSTECTOMY     • COLONOSCOPY     • HIP ARTHROPLASTY Right    • HYSTERECTOMY      partial   • KNEE ARTHROPLASTY Left    • TONSILLECTOMY     • TOTAL SHOULDER ARTHROPLASTY W/ DISTAL CLAVICLE EXCISION Left 2022    Procedure: TOTAL SHOULDER REVERSE ARTHROPLASTY LEFT WITH SUBSCPULAR REPAIR;  Surgeon: Mushtaq Walsh MD;  Location: [facility];  Service: Orthopedics;  Laterality: Left;      General Information     Row Name 05/15/22 1623          Physical Therapy Time and Intention    Document Type therapy note (daily note)  -DM     Mode of Treatment physical therapy  -DM     Row Name 05/15/22 1623          General Information    Existing Precautions/Restrictions fall;non-weight bearing;left;shoulder;other (see comments)  Donjoy Ultra II sling;interscalene nerve cath  -DM           User Key  (r) = Recorded By, (t) = Taken By, (c) = Cosigned By    Initials Name Provider Type    DM Mayra Paul, PT Physical Therapist               Mobility     Row Name 05/15/22 1623          Bed Mobility    Comment, (Bed Mobility) Pt transf to EOB w/ OT upon arrival,& PT brought mesh panty/pad (pt asking about PJ bottoms vs panty); BP taken;LAQ,marches,PLB  -DM     Row Name 05/15/22 1623          Transfers    Comment, (Transfers) cues for HP, seq (w/ SPC R hand)  -DM     Row Name 05/15/22 1623          Sit-Stand Transfer    Sit-Stand [place] (Transfers) verbal cues;contact guard  Pressing up from R EOB W/ SPC R hand;p/t sitting,reached for R armrest of chair(SPC  in hand)   -DM     Assistive Device (Sit-Stand Transfers) cane, straight  -DM     Comment, (Sit-Stand Transfer) Cues for NWB LUE  -DM     Row Name 05/15/22 1623          Gait/Stairs (Locomotion)    [place] Level (Gait) verbal cues;contact guard  -DM     Assistive Device (Gait) cane, straight  -DM     Distance in Feet (Gait) 160  -DM     Deviations/Abnormal Patterns (Gait) bilateral deviations;base of support, narrow;poornima decreased;stride length decreased  Decr step length;varying poornima;Sl.drifting R  -DM     Bilateral Gait Deviations forward flexed posture  gazes at floor  -DM     Comment, (Gait/Stairs) cued to ext trunk/ focus ahead, incr step length,maintain midline & PLB; , desat to 93%  -DM     Row Name 05/15/22 1623          Mobility    Extremity Weight-bearing Status left upper extremity  -DM     Left Upper Extremity (Weight-bearing Status) non weight-bearing (NWB)  -DM           User Key  (r) = Recorded By, (t) = Taken By, (c) = Cosigned By    Initials Name Provider Type    DM Mayra Paul, PT Physical Therapist               Obj/Interventions     Row Name 05/15/22 1623          Motor Skills    Therapeutic Exercise hip;knee;ankle  issued HEP for LE exer & instructed  -DM     Row Name 05/15/22 1623          Hip (Therapeutic Exercise)    Hip (Therapeutic Exercise) AROM (active range of motion);isometric exercises;AAROM (active assistive range of motion)  -DM     Hip AROM (Therapeutic Exercise) bilateral;flexion;extension;external rotation;internal rotation;sitting;10 repetitions  -DM     Hip AAROM (Therapeutic Exercise) bilateral;aDduction;aBduction;sitting;10 repetitions  -DM     Hip Isometrics (Therapeutic Exercise) bilateral;gluteal sets;sitting;10 repetitions;other (see comments)  abdom sets  -DM     Row Name 05/15/22 1623          Knee (Therapeutic Exercise)    Knee (Therapeutic Exercise) AROM (active range of motion);isometric exercises;AAROM (active assistive range of motion)  -DM     Knee AROM  (Therapeutic Exercise) bilateral;flexion;extension;SAQ (short arc quad);LAQ (long arc quad);heel slides;sitting;10 repetitions  -DM     Knee AAROM (Therapeutic Exercise) left;flexion;extension;sitting;10 repetitions  init min A for h.slides x 5,then did 10 addnl. w/ VC's only  -DM     Knee Isometrics (Therapeutic Exercise) bilateral;hamstring sets;quad sets;sitting;10 repetitions  -DM     Row Name 05/15/22 1623          Ankle (Therapeutic Exercise)    Ankle (Therapeutic Exercise) AROM (active range of motion)  -DM     Ankle AROM (Therapeutic Exercise) bilateral;dorsiflexion;plantarflexion;sitting;other (see comments);10 repetitions  AC  -DM     Row Name 05/15/22 1623          Balance    Balance Assessment sitting static balance;sitting dynamic balance;standing static balance;standing dynamic balance  -DM     Static Sitting Balance supervision  -DM     Dynamic Sitting Balance supervision  -DM     Position, Sitting Balance unsupported;sitting in chair;sitting edge of bed  -DM     Static Standing Balance verbal cues  -DM     Dynamic Standing Balance verbal cues;contact guard  Sidestepping/backing to CHAIR  -DM     Position/Device Used, Standing Balance supported;cane, straight  -DM     Balance Interventions sitting;standing;static;dynamic;weight shifting activity  -DM           User Key  (r) = Recorded By, (t) = Taken By, (c) = Cosigned By    Initials Name Provider Type    DM Mayra Paul, PT Physical Therapist               Goals/Plan    No documentation.                Clinical Impression     Row Name 05/15/22 1623          Pain    Pretreatment Pain Rating 6/10  -DM     Posttreatment Pain Rating 5/10  -DM     Pain Location - Side/Orientation Left  -DM     Pain Location - shoulder  -DM     Pre/Posttreatment Pain Comment COLD pack s/p gt  -DM     Pain Intervention(s) Medication (See MAR);Repositioned;Elevated;Rest;Cold applied  -DM     Additional Documentation Pain Scale: Numbers Pre/Post-Treatment (Group)  -DM      Row Name 05/15/22 1623          Plan of Care Review    Plan of Care Reviewed With patient;daughter  -DM     Progress improving  -DM     Outcome Evaluation Pt. able to amb 160 ft w/ SPC R hand & CGA, +performed LE ther exer UIC per HEP, but limited by L shldr pain 5 to 6/10 despite premed. , & BP init elev 155/75 but decr 105/67 w/ mobil.  -DM     Row Name 05/15/22 1623          Vital Signs    Pre Systolic BP Rehab 158  -DM     Pre Treatment Diastolic BP 83  -DM     Intra Systolic BP Rehab 155  -DM     Intra Treatment Diastolic BP 75  -DM     Post Systolic BP Rehab 105  -DM     Post Treatment Diastolic BP 67  -DM     Pretreatment Heart Rate (beats/min) 93  -DM     Intratreatment Heart Rate (beats/min) 102  -DM     Posttreatment Heart Rate (beats/min) 98  -DM     Pre SpO2 (%) 93  -DM     O2 Delivery Pre Treatment room air  -DM     O2 Delivery Intra Treatment room air  -DM     O2 Delivery Post Treatment room air  -DM     Pre Patient Position Sitting  -DM     Intra Patient Position Standing  -DM     Post Patient Position Sitting  -DM     Rest Breaks  2  -DM     Row Name 05/15/22 1623          Positioning and Restraints    Pre-Treatment Position in bed  -DM     Post Treatment Position chair  -DM     In Chair notified nsg;reclined;call light within reach;encouraged to call for assist;exit alarm on;with family/caregiver;waffle cushion;legs elevated;LUE elevated  -DM           User Key  (r) = Recorded By, (t) = Taken By, (c) = Cosigned By    Initials Name Provider Type    DM Mayra Paul, PT Physical Therapist               Outcome Measures     Row Name 05/15/22 1623 05/15/22 0800       How much help from another person do you currently need...    Turning from your back to your side while in flat bed without using bedrails? 4  -DM 4  -GJ    Moving from lying on back to sitting on the side of a flat bed without bedrails? 3  -DM 3  -GJ    Moving to and from a bed to a chair (including a wheelchair)? 3  -DM 3  -GJ     Standing up from a chair using your arms (e.g., wheelchair, bedside chair)? 3  -DM 3  -GJ    Climbing 3-5 steps with a railing? 3  -DM 3  -GJ    To walk in hospital room? 3  -DM 3  -GJ    AM-PAC 6 Clicks Score (PT) 19  -DM 19  -GJ    Highest level of mobility 6 --> Walked 10 steps or more  -DM 6 --> Walked 10 steps or more  -GJ    Row Name 05/15/22 1652 05/15/22 1623       Functional Assessment    Outcome Measure Options AM-PAC 6 Clicks Daily Activity (OT)  -AR AM-PAC 6 Clicks Basic Mobility (PT)  -DM          User Key  (r) = Recorded By, (t) = Taken By, (c) = Cosigned By    Initials Name Provider Type    DM Mayra Paul, PT Physical Therapist    Deya Oconnor, OT Occupational Therapist    Waleska Rincon RN Registered Nurse                             Physical Therapy Education                 Title: PT OT SLP Therapies (In Progress)     Topic: Physical Therapy (In Progress)     Point: Mobility training (In Progress)     Learning Progress Summary           Patient Eager, E,D,H, NR by DM at 5/15/2022 1659    Acceptance, E,D, VU by KITA at 5/14/2022 1355    Comment: Educated on safe sequencing with bed mobility, ambulatory transfers, and gait. Reviewed NWB precautions.    Acceptance, E,D, VU,NR by LR at 5/13/2022 1123    Comment: Educated on NWB status of L UE, precautions, safety with mobility, correct sit<->stand t/f technique, correct gait mechanics, benefits of frequent ambulation, and progression of POC.   Family Eager, E,D,H, NR by DM at 5/15/2022 1659                   Point: Home exercise program (In Progress)     Learning Progress Summary           Patient Eager, E,D,H, NR by DM at 5/15/2022 1659    Acceptance, E,D, VU by KITA at 5/14/2022 1355    Comment: Educated on safe sequencing with bed mobility, ambulatory transfers, and gait. Reviewed NWB precautions.    Acceptance, E,D, VU,NR by LR at 5/13/2022 1123    Comment: Educated on NWB status of L UE, precautions, safety with mobility, correct  sit<->stand t/f technique, correct gait mechanics, benefits of frequent ambulation, and progression of POC.   Family Eager, E,D,H, NR by DM at 5/15/2022 1659                   Point: Body mechanics (In Progress)     Learning Progress Summary           Patient Eager, E,D,H, NR by DM at 5/15/2022 1659    Acceptance, E,D, VU by KITA at 5/14/2022 1355    Comment: Educated on safe sequencing with bed mobility, ambulatory transfers, and gait. Reviewed NWB precautions.    Acceptance, E,D, VU,NR by LR at 5/13/2022 1123    Comment: Educated on NWB status of L UE, precautions, safety with mobility, correct sit<->stand t/f technique, correct gait mechanics, benefits of frequent ambulation, and progression of POC.   Family Elieer, E,D,H, NR by DM at 5/15/2022 1659                   Point: Precautions (In Progress)     Learning Progress Summary           Patient Eager, E,D,H, NR by DM at 5/15/2022 1659    Acceptance, E,D, VU by KITA at 5/14/2022 1355    Comment: Educated on safe sequencing with bed mobility, ambulatory transfers, and gait. Reviewed NWB precautions.    Acceptance, E,D, VU,NR by LR at 5/13/2022 1123    Comment: Educated on NWB status of L UE, precautions, safety with mobility, correct sit<->stand t/f technique, correct gait mechanics, benefits of frequent ambulation, and progression of POC.   Family Laurie, E,D,H, NR by DM at 5/15/2022 1659                               User Key     Initials Effective Dates Name Provider Type Discipline    DM 06/16/21 -  Mayra Paul, PT Physical Therapist PT    LR 06/16/21 -  Libertad Fairchild, PT Physical Therapist PT    KITA 06/16/21 -  Clive Moise, PT Physical Therapist PT              PT Recommendation and Plan     Plan of Care Reviewed With: patient, daughter  Progress: improving  Outcome Evaluation: Pt. able to amb 160 ft w/ SPC R hand & CGA, +performed LE ther exer UIC per HEP, but limited by L shldr pain 5 to 6/10 despite premed. , & BP init elev 155/75 but decr  105/67 w/ mobil.     Time Calculation:    PT Charges     Row Name 05/15/22 1659             Time Calculation    Start Time 1623  -DM      PT Received On 05/15/22  -DM      PT Goal Re-Cert Due Date 05/23/22  -DM              Time Calculation- PT    Total Timed Code Minutes- PT 23 minute(s)  -DM              Timed Charges    09418 - PT Therapeutic Exercise Minutes 11  -DM      61560 - Gait Training Minutes  12  -DM              Total Minutes    Timed Charges Total Minutes 23  -DM       Total Minutes 23  -DM            User Key  (r) = Recorded By, (t) = Taken By, (c) = Cosigned By    Initials Name Provider Type    DM Mayra Paul, PT Physical Therapist              Therapy Charges for Today     Code Description Service Date Service Provider Modifiers Qty    85335645162 HC PT THER PROC EA 15 MIN 5/15/2022 Mayra Paul, PT GP 1    29680832565 HC GAIT TRAINING EA 15 MIN 5/15/2022 Mayra Paul, PT GP 1          PT G-Codes  Outcome Measure Options: AM-PAC 6 Clicks Daily Activity (OT)  AM-PAC 6 Clicks Score (PT): 19  AM-PAC 6 Clicks Score (OT): 14    Mayra Paul, PT  5/15/2022

## 2022-05-15 NOTE — PLAN OF CARE
Goal Outcome Evaluation:  Plan of Care Reviewed With: patient        Progress: improving     Patient is alert and oriented. Complaints of pain in left shoulder with interventions being effective. Arrow pump still in place infusing at 6ml/hr. Patient slept throughout the night with melatonin. Continent of void. V/S remain stable. Will continue to monitor.

## 2022-05-15 NOTE — PROGRESS NOTES
"IM progress note      Heidi Tomlinson  [id number]  1935     LOS: 0 days     Attending: Mushtaq Walsh MD    Primary Care Provider: Alejo Negron MD      Chief Complaint/Reason for visit:  No chief complaint on file.    Late entry:    Subjective   Feels okay. Some pain despite block. No f/c/n/vom. Anxiety in the afternoon.     Objective        Visit Vitals  /69 (BP Location: Right arm, Patient Position: Lying)   Pulse 80   Temp 98.3 °F (36.8 °C) (Oral)   Resp 16   Ht 160 cm (63\")   Wt 62.6 kg (138 lb)   SpO2 94%   BMI 24.45 kg/m²     Temp (24hrs), Av.2 °F (36.8 °C), Min:97.9 °F (36.6 °C), Max:98.4 °F (36.9 °C)      Intake/Output:  No intake or output data in the 24 hours ending 05/15/22 1019     OT:  Plan of Care Reviewed With: patient  Progress: declining  Outcome Evaluation: OT reviewed L shoulder precautions, ADL retraining to maintain, sling management and LUE HEP. Pt tearful with c/o - nurse notified. She declined further OOB activity following PT session. Recommend IPR.    Physical Exam:     General Appearance:    Alert, cooperative, in no acute distress   Head:    Normocephalic, without obvious abnormality, atraumatic    Lungs:     Normal effort, symmetric chest rise,  clear to      auscultation bilaterally              Heart:    Regular rhythm and normal rate, normal S1 and S2    Abdomen:     Normal bowel sounds, no masses, no organomegaly, soft        non-tender, non-distended, no guarding, no rebound                tenderness   Extremities:  Left upper extremity in a sling.  Clean dry intact Aquacel dressing.  Peripheral nerve block catheter present.  Moves her wrist and hand without difficulty.  Cap refill distally intact.  No clubbing, cyanosis or edema.  No deformities.    Pulses:   Pulses palpable and equal bilaterally   Skin:   No bleeding, bruising or rash          Results Review:     I reviewed the patient's new clinical results.   Results from last 7 days   Lab Units " 05/14/22  1328 05/13/22  0528 05/10/22  0827   WBC 10*3/mm3  --  7.68 5.81   HEMOGLOBIN g/dL 9.1* 7.9* 10.2*   HEMATOCRIT % 28.2* 24.4* 32.9*   PLATELETS 10*3/mm3  --  328 407     Results from last 7 days   Lab Units 05/14/22  1105 05/13/22  0528 05/10/22  0827   SODIUM mmol/L  --  136 136   POTASSIUM mmol/L 3.4* 3.2* 3.5   CHLORIDE mmol/L  --  101 97*   CO2 mmol/L  --  26.0 27.0   BUN mg/dL  --  8 15   CREATININE mg/dL  --  0.46* 0.55*   CALCIUM mg/dL  --  8.4* 9.5   BILIRUBIN mg/dL  --   --  0.3   ALK PHOS U/L  --   --  95   ALT (SGPT) U/L  --   --  11   AST (SGOT) U/L  --   --  20   GLUCOSE mg/dL  --  113* 106*     I reviewed the patient's new imaging including images and reports.    All medications reviewed.   acetaminophen, 1,000 mg, Oral, Q12H      HYDROmorphone, 0.5 mg, Q2H PRN   And  naloxone, 0.1 mg, Q5 Min PRN  hydrOXYzine, 25 mg, Q6H PRN  labetalol, 10 mg, Q4H PRN  magnesium sulfate, 2 g, PRN   Or  magnesium sulfate, 2 g, PRN   Or  magnesium sulfate, 4 g, PRN  melatonin, 5 mg, Nightly PRN  ondansetron, 4 mg, Q6H PRN   Or  ondansetron, 4 mg, Q6H PRN  oxyCODONE, 10 mg, Q4H PRN  oxyCODONE, 5 mg, Q4H PRN  potassium chloride, 40 mEq, PRN  potassium chloride, 40 mEq, PRN  sodium chloride, 500 mL, TID PRN        Assessment & Plan       S/P reverse total shoulder arthroplasty, left    Glenohumeral arthritis, left    Hypokalemia    Hypokalemia, replaced  Acute blood loss anemia, monitor, no transfusion anticipated.     Plan   1. PT/OT. NWB, left UE, ROM hand, wrist, elbow.  2. Pain control-prns, interscalene nerve block cath with ropivacaine infusion.           3. IS-encourage  4. DVT proph- Mech/ mobilize.  5. Bowel regimen  6. Resume home medications as appropriate  7. Monitor post-op labs    Potassium and magnesium replacement per protocol.    D/w pt and RN.  DC planning.  Inpatient rehab recommended.   following.      Vicki Scott MD  05/15/22  10:19 EDT

## 2022-05-15 NOTE — PLAN OF CARE
Problem: Adult Inpatient Plan of Care  Goal: Plan of Care Review  Outcome: Ongoing, Progressing  Flowsheets (Taken 5/15/2022 1711)  Progress: improving  Plan of Care Reviewed With: patient  Goal: Patient-Specific Goal (Individualized)  Outcome: Ongoing, Progressing  Goal: Absence of Hospital-Acquired Illness or Injury  Outcome: Ongoing, Progressing  Intervention: Identify and Manage Fall Risk  Recent Flowsheet Documentation  Taken 5/15/2022 1557 by Waleska Vásquez RN  Safety Promotion/Fall Prevention:   activity supervised   assistive device/personal items within reach   clutter free environment maintained   room organization consistent   safety round/check completed   toileting scheduled  Taken 5/15/2022 1400 by Waleska Vásquez RN  Safety Promotion/Fall Prevention:   activity supervised   assistive device/personal items within reach   clutter free environment maintained   room organization consistent   safety round/check completed   toileting scheduled  Taken 5/15/2022 1200 by Waleska Vásquez RN  Safety Promotion/Fall Prevention:   activity supervised   assistive device/personal items within reach   clutter free environment maintained   room organization consistent   safety round/check completed   toileting scheduled  Taken 5/15/2022 1000 by Waleska Vásquez RN  Safety Promotion/Fall Prevention:   activity supervised   assistive device/personal items within reach   clutter free environment maintained   room organization consistent   safety round/check completed   toileting scheduled  Taken 5/15/2022 0800 by Waleska Vásquez RN  Safety Promotion/Fall Prevention:   activity supervised   assistive device/personal items within reach   clutter free environment maintained   room organization consistent   safety round/check completed   toileting scheduled  Intervention: Prevent Skin Injury  Recent Flowsheet Documentation  Taken 5/15/2022 1557 by Waleska Vásquez RN  Body Position: position changed  independently  Taken 5/15/2022 1400 by Waleska Vásquez RN  Body Position: position changed independently  Taken 5/15/2022 1200 by Waleska Vásquez RN  Body Position: position changed independently  Taken 5/15/2022 1000 by Waleska Vásquez RN  Body Position: position changed independently  Taken 5/15/2022 0800 by Waleska Vásquez RN  Body Position: position changed independently  Intervention: Prevent and Manage VTE (Venous Thromboembolism) Risk  Recent Flowsheet Documentation  Taken 5/15/2022 1557 by Waleska Vásquez RN  Activity Management:   activity adjusted per tolerance   activity encouraged  VTE Prevention/Management: patient refused intervention  Taken 5/15/2022 1400 by Waleska Vásquez RN  Activity Management:   activity adjusted per tolerance   activity encouraged  VTE Prevention/Management: patient refused intervention  Taken 5/15/2022 1200 by Waleska Vásquez RN  Activity Management:   activity adjusted per tolerance   activity encouraged  VTE Prevention/Management: patient refused intervention  Taken 5/15/2022 1000 by Waleska Vásquez RN  Activity Management:   activity adjusted per tolerance   activity encouraged  VTE Prevention/Management: patient refused intervention  Taken 5/15/2022 0800 by Waleska Vásquez RN  Activity Management:   activity adjusted per tolerance   activity encouraged  VTE Prevention/Management: patient refused intervention  Intervention: Prevent Infection  Recent Flowsheet Documentation  Taken 5/15/2022 1557 by Waleska Vásquez RN  Infection Prevention:   environmental surveillance performed   rest/sleep promoted  Taken 5/15/2022 1400 by Waleska Vásquez RN  Infection Prevention:   environmental surveillance performed   rest/sleep promoted  Taken 5/15/2022 1200 by Waleska Vásquez RN  Infection Prevention:   environmental surveillance performed   rest/sleep promoted  Taken 5/15/2022 1000 by Waleska Vásquez RN  Infection Prevention:   environmental surveillance  performed   rest/sleep promoted  Taken 5/15/2022 0800 by Waleska Vásquez, RN  Infection Prevention:   environmental surveillance performed   rest/sleep promoted  Goal: Optimal Comfort and Wellbeing  Outcome: Ongoing, Progressing  Intervention: Monitor Pain and Promote Comfort  Recent Flowsheet Documentation  Taken 5/15/2022 1557 by Waleska Vásquez, RN  Pain Management Interventions: see MAR  Intervention: Provide Person-Centered Care  Recent Flowsheet Documentation  Taken 5/15/2022 0800 by Waleska Vásquez RN  Trust Relationship/Rapport: care explained  Goal: Readiness for Transition of Care  Outcome: Ongoing, Progressing   Goal Outcome Evaluation:  Plan of Care Reviewed With: patient        Progress: improving     Pt alert and oriented x4. VSS. RA. Arrow @ 6ml/hr. Up with stand-by assist. Aquacel CDI. Sling in place. Pain controlled with PRN pain medication

## 2022-05-15 NOTE — PROGRESS NOTES
[facility]    Acute pain service Inpatient Progress Note    Patient Name: Heidi Tomlinson  :  1935  MRN:  [id number]        Acute Pain  Service Inpatient Progress Note:    Analgesia:Good  LOC: alert and awake  Resp Status: room air  Cardiac: VS stable  Side Effects:None  Catheter Site:clean, dressing intact and dry  Cath type: peripheral nerve cath with ON Q  Infusion rate: 6ml/hr  Catheter Plan:Catheter to remain Insitu and Continue catheter infusion rate unchanged

## 2022-05-16 VITALS
DIASTOLIC BLOOD PRESSURE: 66 MMHG | RESPIRATION RATE: 18 BRPM | HEIGHT: 63 IN | TEMPERATURE: 98.3 F | SYSTOLIC BLOOD PRESSURE: 155 MMHG | OXYGEN SATURATION: 100 % | BODY MASS INDEX: 24.45 KG/M2 | HEART RATE: 84 BPM | WEIGHT: 138 LBS

## 2022-05-16 LAB
POTASSIUM SERPL-SCNC: 3.8 MMOL/L (ref 3.5–5.2)
SARS-COV-2 RDRP RESP QL NAA+PROBE: NORMAL

## 2022-05-16 PROCEDURE — 63710000001 HYDROXYZINE 25 MG TABLET: Performed by: INTERNAL MEDICINE

## 2022-05-16 PROCEDURE — 84132 ASSAY OF SERUM POTASSIUM: CPT | Performed by: INTERNAL MEDICINE

## 2022-05-16 PROCEDURE — A9270 NON-COVERED ITEM OR SERVICE: HCPCS | Performed by: INTERNAL MEDICINE

## 2022-05-16 PROCEDURE — 87635 SARS-COV-2 COVID-19 AMP PRB: CPT | Performed by: ORTHOPAEDIC SURGERY

## 2022-05-16 PROCEDURE — 63710000001 ACETAMINOPHEN 500 MG TABLET: Performed by: NURSE ANESTHETIST, CERTIFIED REGISTERED

## 2022-05-16 PROCEDURE — A9270 NON-COVERED ITEM OR SERVICE: HCPCS | Performed by: ORTHOPAEDIC SURGERY

## 2022-05-16 PROCEDURE — A9270 NON-COVERED ITEM OR SERVICE: HCPCS | Performed by: NURSE ANESTHETIST, CERTIFIED REGISTERED

## 2022-05-16 PROCEDURE — 63710000001 OXYCODONE 5 MG TABLET: Performed by: ORTHOPAEDIC SURGERY

## 2022-05-16 PROCEDURE — 97535 SELF CARE MNGMENT TRAINING: CPT | Performed by: OCCUPATIONAL THERAPIST

## 2022-05-16 PROCEDURE — 97110 THERAPEUTIC EXERCISES: CPT | Performed by: OCCUPATIONAL THERAPIST

## 2022-05-16 PROCEDURE — 63710000001 PSYLLIUM 58.12 % PACK: Performed by: INTERNAL MEDICINE

## 2022-05-16 PROCEDURE — 63710000001 SACCHAROMYCES BOULARDII 250 MG CAPSULE: Performed by: INTERNAL MEDICINE

## 2022-05-16 RX ORDER — SACCHAROMYCES BOULARDII 250 MG
250 CAPSULE ORAL 2 TIMES DAILY
Status: DISCONTINUED | OUTPATIENT
Start: 2022-05-16 | End: 2022-05-16 | Stop reason: HOSPADM

## 2022-05-16 RX ORDER — OXYCODONE HYDROCHLORIDE 5 MG/1
5 TABLET ORAL EVERY 4 HOURS PRN
Qty: 12 TABLET | Refills: 0
Start: 2022-05-16 | End: 2022-05-19

## 2022-05-16 RX ADMIN — OXYCODONE 10 MG: 5 TABLET ORAL at 09:40

## 2022-05-16 RX ADMIN — HYDROXYZINE HYDROCHLORIDE 25 MG: 25 TABLET, FILM COATED ORAL at 09:43

## 2022-05-16 RX ADMIN — PSYLLIUM HUSK 1 PACKET: 3.4 POWDER ORAL at 09:39

## 2022-05-16 RX ADMIN — ACETAMINOPHEN 1000 MG: 500 TABLET ORAL at 08:07

## 2022-05-16 RX ADMIN — Medication 250 MG: at 09:39

## 2022-05-16 NOTE — DISCHARGE INSTR - ACTIVITY
Activity as tolerated.  NWB left upper extremity, ROM elbow, wrist, and hand per Dr. Walsh's instructions

## 2022-05-16 NOTE — CASE MANAGEMENT/SOCIAL WORK
Case Management Discharge Note      Final Note: Ms. Tomlinson has a skilled bed at [facility] today, if medically ready.  Contacted Dr. Arellano.  CM will update the patient at the bedside.  Notified Ms. Tomlinson's daughter, Adali, by phone, and she is agreeable to DC plan.  Adali will be transporting her Mother to the facility by personal vehicle.  Covid test ordered and results are pending.  Facility Pharmacy is [facility], and is noted in [employer] for e-scribing.  Please call report to [facility] at ph [phone], and have a copy of the DC summary and AVS in the DC packet.  Thank you.    Confirmed bed with [facility] at [facility].  [facility] is waiving the Medicare inpatient stay during the COVID pandemic.       Selected Continued Care - Admitted Since 5/12/2022     Destination Coordination complete.    Service Provider Selected Services Address Phone Fax Patient Preferred    [facility]  Skilled Nursing [address], [facility] [address] [phone] --              Final Discharge Disposition Code: 03 - skilled nursing facility (SNF)

## 2022-05-16 NOTE — PLAN OF CARE
Goal Outcome Evaluation:               VSS. Patient resting comfortably. No significant events to report for evening shift. Patient progressing towards discharge.

## 2022-05-16 NOTE — THERAPY TREATMENT NOTE
Patient Name: Heidi Tomlinson  : 1935    MRN: [id number]                              Today's Date: 2022       Admit Date: 2022    Visit Dx:     ICD-10-CM ICD-9-CM   1. S/P reverse total shoulder arthroplasty, left  Z96.612 V43.61     Patient Active Problem List   Diagnosis   • Glenohumeral arthritis, left   • S/P reverse total shoulder arthroplasty, left   • Hypokalemia, replaced   • Hypomagnesemia, replaced   • Postoperative pain     Past Medical History:   Diagnosis Date   • Arthritis      Past Surgical History:   Procedure Laterality Date   • CHOLECYSTECTOMY     • COLONOSCOPY     • HIP ARTHROPLASTY Right    • HYSTERECTOMY      partial   • KNEE ARTHROPLASTY Left    • TONSILLECTOMY     • TOTAL SHOULDER ARTHROPLASTY W/ DISTAL CLAVICLE EXCISION Left 2022    Procedure: TOTAL SHOULDER REVERSE ARTHROPLASTY LEFT WITH SUBSCPULAR REPAIR;  Surgeon: Mushtaq Walsh MD;  Location: [facility];  Service: Orthopedics;  Laterality: Left;      General Information     Row Name 22 1013          OT Time and Intention    Document Type therapy note (daily note)  -AR     Mode of Treatment individual therapy;occupational therapy  -AR     Row Name 22 1013          General Information    Existing Precautions/Restrictions fall;non-weight bearing;left;shoulder;other (see comments)  Donjoy Ultra II sling (pillow applied)  -AR     Row Name 22 1013          Cognition    Orientation Status (Cognition) oriented x 4  -AR     Row Name 22 1013          Safety Issues, Functional Mobility    Safety Issues Affecting Function (Mobility) safety precaution awareness;safety precautions follow-through/compliance  -AR     Impairments Affecting Function (Mobility) balance;endurance/activity tolerance;pain;strength  -AR           User Key  (r) = Recorded By, (t) = Taken By, (c) = Cosigned By    Initials Name Provider Type    Deya Oconnor, OT Occupational Therapist                  Mobility/ADL's     Row Name 05/16/22 1014          Bed Mobility    Bed Mobility scooting/bridging;supine-sit  -AR     Scooting/Bridging [place] (Bed Mobility) contact guard;verbal cues  -AR     Supine-Sit [place] (Bed Mobility) minimum assist (75% patient effort);verbal cues  -AR     Bed Mobility, Safety Issues decreased use of arms for pushing/pulling  -AR     Assistive Device (Bed Mobility) head of bed elevated  -AR     Comment, (Bed Mobility) Good ability to maintain NWB LUE AAT  -AR     Row Name 05/16/22 1014          Transfers    Transfers sit-stand transfer;stand-sit transfer;bed-chair transfer  -AR     Comment, (Transfers) Cues for hand placement and chair approach  -AR     Bed-Chair [place] (Transfers) contact guard;verbal cues  -AR     Assistive Device (Bed-Chair Transfers) cane, straight  -AR     Sit-Stand [place] (Transfers) contact guard;verbal cues  -AR     Stand-Sit [place] (Transfers) contact guard;verbal cues  -AR     Row Name 05/16/22 1014          Sit-Stand Transfer    Assistive Device (Sit-Stand Transfers) cane, straight  -AR     Row Name 05/16/22 1014          Stand-Sit Transfer    Assistive Device (Stand-Sit Transfers) cane, straight  -AR     Row Name 05/16/22 1014          Activities of Daily Living    BADL Assessment/Intervention bathing;upper body dressing;grooming;feeding  -AR     Row Name 05/16/22 1014          Mobility    Extremity Weight-bearing Status left upper extremity  -AR     Left Upper Extremity (Weight-bearing Status) non weight-bearing (NWB)  -AR     Row Name 05/16/22 1014          Upper Body Dressing Assessment/Training    [place] Level (Upper Body Dressing) doff;front opening garment;don;maximum assist (25% patient effort)  -AR     Position (Upper Body Dressing) edge of bed sitting  -AR     Comment, (Upper Body Dressing) Reviewed L shoulder precautions, ADL retraining to maintain and sling management. Pt c/o L elbow entension in sling causing L  axilla pain. OT reapplied body pillow to sling as pt is a RTSA, she c/o improved comfort. She required max assist sling management.  -AR     Row Name 05/16/22 1014          Bathing Assessment/Intervention    [place] Level (Bathing) upper body;maximum assist (25% patient effort)  -AR     Position (Bathing) edge of bed sitting  -AR     Comment, (Bathing) Reviewed L shoulder pecautions and L axilla care to maintain  -AR     Row Name 05/16/22 1014          Grooming Assessment/Training    [place] Level (Grooming) oral care regimen;moderate assist (50% patient effort);hair care, combing/brushing;wash face, hands;supervision;set up  -AR     Position (Grooming) edge of bed sitting;supported sitting  -AR     Row Name 05/16/22 1014          Self-Feeding Assessment/Training    Rand Level (Feeding) liquids to mouth;supervision;set up  -AR     Position (Self-Feeding) supported sitting  -AR           User Key  (r) = Recorded By, (t) = Taken By, (c) = Cosigned By    Initials Name Provider Type    Deya Oconnor, OT Occupational Therapist               Obj/Interventions     Row Name 05/16/22 1021          Sensory Assessment (Somatosensory)    Sensory Assessment (Somatosensory) UE sensation intact  -AR     Row Name 05/16/22 1021          Elbow/Forearm (Therapeutic Exercise)    Elbow/Forearm AROM (Therapeutic Exercise) left;extension;flexion;supination;pronation;sitting;10 repetitions  required cues to recall L elbow exercises  -AR     Row Name 05/16/22 1021          Wrist (Therapeutic Exercise)    Wrist (Therapeutic Exercise) AROM (active range of motion)  -AR     Wrist AROM (Therapeutic Exercise) left;extension;flexion;10 repetitions  -AR     Row Name 05/16/22 1021          Hand (Therapeutic Exercise)    Hand AROM/AAROM (Therapeutic Exercise) left;AROM (active range of motion);finger flexion;finger extension;10 repetitions  -AR     Row Name 05/16/22 1021          Motor Skills    Therapeutic Exercise  elbow/forearm;wrist;hand  -AR     Row Name 05/16/22 1021          Balance    Balance Assessment sitting static balance;standing static balance;sitting dynamic balance;sit to stand dynamic balance  -AR     Static Sitting Balance independent  -AR     Dynamic Sitting Balance independent  -AR     Static Standing Balance contact guard;verbal cues  -AR     Dynamic Standing Balance contact guard;verbal cues  -AR     Position/Device Used, Standing Balance cane, straight  -AR           User Key  (r) = Recorded By, (t) = Taken By, (c) = Cosigned By    Initials Name Provider Type    Deya Oconnor, OT Occupational Therapist               Goals/Plan    No documentation.                Clinical Impression     Row Name 05/16/22 1024          Pain Assessment    Pretreatment Pain Rating 6/10  -AR     Posttreatment Pain Rating 4/10  -AR     Pain Location - Side/Orientation Left  -AR     Pain Location generalized  -AR     Pain Location - shoulder  -AR     Pre/Posttreatment Pain Comment Pt declined cold pack. Nurse notified of pt's pain level and medicated during session  -AR     Pain Intervention(s) Medication (See MAR);Repositioned;Ambulation/increased activity  -AR     Row Name 05/16/22 1024          Plan of Care Review    Plan of Care Reviewed With patient  -AR     Progress improving  -AR     Outcome Evaluation Pt completed bed mobility with min assist, UBB and UB dressing with max assist, and completed LUE HEP within physician parameters with supervision and min cues. She completed transfer training with CGA. Body pillow reapplied to improve comfort and positioning. Recommend IPR.  -AR     Row Name 05/16/22 1024          Therapy Plan Review/Discharge Plan (OT)    Anticipated Discharge Disposition (OT) inpatient rehabilitation facility  -AR     Row Name 05/16/22 1024          Vital Signs    Pre Patient Position Supine  -AR     Intra Patient Position Standing  -AR     Post Patient Position Sitting  -AR     Row Name 05/16/22  1024          Positioning and Restraints    Pre-Treatment Position in bed  -AR     Post Treatment Position chair  -AR     In Chair reclined;call light within reach;encouraged to call for assist;exit alarm on;waffle cushion;on mechanical lift sling;with brace;legs elevated  -AR           User Key  (r) = Recorded By, (t) = Taken By, (c) = Cosigned By    Initials Name Provider Type    Deya Oconnor OT Occupational Therapist               Outcome Measures     Row Name 05/16/22 1027          How much help from another is currently needed...    Putting on and taking off regular lower body clothing? 2  -AR     Bathing (including washing, rinsing, and drying) 2  -AR     Toileting (which includes using toilet bed pan or urinal) 2  -AR     Putting on and taking off regular upper body clothing 2  -AR     Taking care of personal grooming (such as brushing teeth) 3  -AR     Eating meals 3  -AR     AM-PAC 6 Clicks Score (OT) 14  -AR     Row Name 05/16/22 0807          How much help from another person do you currently need...    Turning from your back to your side while in flat bed without using bedrails? 4  -AH     Moving from lying on back to sitting on the side of a flat bed without bedrails? 3  -AH     Moving to and from a bed to a chair (including a wheelchair)? 3  -AH     Standing up from a chair using your arms (e.g., wheelchair, bedside chair)? 3  -AH     Climbing 3-5 steps with a railing? 3  -AH     To walk in hospital room? 3  -     AM-PAC 6 Clicks Score (PT) 19  -AH     Highest level of mobility 6 --> Walked 10 steps or more  -     Row Name 05/16/22 1027          Functional Assessment    Outcome Measure Options AM-PAC 6 Clicks Daily Activity (OT)  -AR           User Key  (r) = Recorded By, (t) = Taken By, (c) = Cosigned By    Initials Name Provider Type    Deya Oconnor OT Occupational Therapist    Deya Dee, RN Registered Nurse                Occupational Therapy Education                  Title: PT OT SLP Therapies (In Progress)     Topic: Occupational Therapy (In Progress)     Point: ADL training (In Progress)     Description:   Instruct learner(s) on proper safety adaptation and remediation techniques during self care or transfers.   Instruct in proper use of assistive devices.              Learning Progress Summary           Patient Eager, E,TB,D, VU,NR by AR at 5/16/2022 1028    Eager, E,D,H, NR by DM at 5/15/2022 1659    Eager, E,D, VU,NR by AR at 5/15/2022 1652    Eager, E,TB,D, VU,NR by AR at 5/14/2022 1615    Acceptance, E,D, NR,VU by LC at 5/13/2022 1333    Acceptance, E,TB,D, VU,NR by HK at 5/12/2022 1432   Family Eager, E,D,H, NR by DM at 5/15/2022 1659    Eager, E,D, VU,NR by AR at 5/15/2022 1652    Acceptance, E,D, NR,VU by LC at 5/13/2022 1333                   Point: Home exercise program (In Progress)     Description:   Instruct learner(s) on appropriate technique for monitoring, assisting and/or progressing therapeutic exercises/activities.              Learning Progress Summary           Patient Eager, E,TB,D, VU,NR by AR at 5/16/2022 1028    Eager, E,D,H, NR by DM at 5/15/2022 1659    Eager, E,D, VU,NR by AR at 5/15/2022 1652    Eager, E,TB,D, VU,NR by AR at 5/14/2022 1615    Acceptance, E,D, NR,VU by LC at 5/13/2022 1333    Acceptance, E,TB,D, VU,NR by HK at 5/12/2022 1432   Family Eager, E,D,H, NR by DM at 5/15/2022 1659    Eager, E,D, VU,NR by AR at 5/15/2022 1652    Acceptance, E,D, NR,VU by LC at 5/13/2022 1333                   Point: Precautions (In Progress)     Description:   Instruct learner(s) on prescribed precautions during self-care and functional transfers.              Learning Progress Summary           Patient Eager, E,TB,D, VU,NR by AR at 5/16/2022 1028    Eager, E,D,H, NR by DM at 5/15/2022 1659    Eager, E,D, VU,NR by AR at 5/15/2022 1652    Eager, E,TB,D, VU,NR by AR at 5/14/2022 1615    Acceptance, E,D, NR,VU by LC at 5/13/2022 1333    Acceptance, E,TB,D,  VU,NR by HK at 5/12/2022 1432   Family Eager, E,D,H, NR by DM at 5/15/2022 1659    Eager, E,D, VU,NR by AR at 5/15/2022 1652    Acceptance, E,D, NR,VU by LC at 5/13/2022 1333                   Point: Body mechanics (In Progress)     Description:   Instruct learner(s) on proper positioning and spine alignment during self-care, functional mobility activities and/or exercises.              Learning Progress Summary           Patient Eager, E,TB,D, VU,NR by AR at 5/16/2022 1028    Eager, E,D,H, NR by DM at 5/15/2022 1659    Eager, E,D, VU,NR by AR at 5/15/2022 1652    Eager, E,TB,D, VU,NR by AR at 5/14/2022 1615    Acceptance, E,D, NR,VU by LC at 5/13/2022 1333    Acceptance, E,TB,D, VU,NR by HK at 5/12/2022 1432   Family Eager, E,D,H, NR by DM at 5/15/2022 1659    Eager, E,D, VU,NR by AR at 5/15/2022 1652    Acceptance, E,D, NR,VU by LC at 5/13/2022 1333                               User Key     Initials Effective Dates Name Provider Type Discipline    DM 06/16/21 -  Mayra Paul, PT Physical Therapist PT    AR 06/16/21 -  Deya Quan, OT Occupational Therapist OT     06/16/21 -  Huong Luna, OT Occupational Therapist OT     06/16/21 -  Libertad Caro OT Occupational Therapist OT              OT Recommendation and Plan     Plan of Care Review  Plan of Care Reviewed With: patient  Progress: improving  Outcome Evaluation: Pt completed bed mobility with min assist, UBB and UB dressing with max assist, and completed LUE HEP within physician parameters with supervision and min cues. She completed transfer training with CGA. Body pillow reapplied to improve comfort and positioning. Recommend IPR.     Time Calculation:    Time Calculation- OT     Row Name 05/16/22 1028             Time Calculation- OT    OT Start Time 0911  -AR      OT Received On 05/16/22  -AR      OT Goal Re-Cert Due Date 05/22/22  -AR              Timed Charges    32125 - OT Therapeutic Exercise Minutes 11  -AR      82291 - OT Self  Care/Mgmt Minutes 59  -AR              Total Minutes    Timed Charges Total Minutes 70  -AR       Total Minutes 70  -AR            User Key  (r) = Recorded By, (t) = Taken By, (c) = Cosigned By    Initials Name Provider Type    Deya Oconnor OT Occupational Therapist              Therapy Charges for Today     Code Description Service Date Service Provider Modifiers Qty    48260462619 HC OT SELF CARE/MGMT/TRAIN EA 15 MIN 5/15/2022 Deya Quan OT GO 1    93044170337 HC OT SELF CARE/MGMT/TRAIN EA 15 MIN 5/16/2022 Deya uQan OT GO 4    55913098697 HC OT THER PROC EA 15 MIN 5/16/2022 Deya Quan OT GO 1               Deya Quan OT  5/16/2022

## 2022-05-16 NOTE — PLAN OF CARE
Goal Outcome Evaluation:  Plan of Care Reviewed With: patient        Progress: improving  Outcome Evaluation: Pt completed bed mobility with min assist, UBB and UB dressing with max assist, and completed LUE HEP within physician parameters with supervision and min cues. She completed transfer training with CGA. Body pillow reapplied to improve comfort and positioning. Recommend IPR.

## 2022-05-16 NOTE — PROGRESS NOTES
Orthopedic Daily Progress Note      CC: reverse TSA    Pain well controlled  General: no fevers, chills  Abdomen: no nausea, vomiting, or diarrhea    No other complaints    Physical Exam:  I have reviewed the vital signs.  Temp:  [98.1 °F (36.7 °C)-98.8 °F (37.1 °C)] 98.1 °F (36.7 °C)  Heart Rate:  [] 94  Resp:  [16-18] 16  BP: (151-168)/(64-83) 163/79    Objective  General Appearance:    Alert, cooperative, no distress  Extremities: No clubbing, cyanosis, or edema to lower extremities  Pulses:  2+ in distal surgical extremity  Skin: Dressing Clean/dry/intact      Results Review:    I have reviewed the labs, radiology results and diagnostic studies:    Results from last 7 days   Lab Units 05/14/22  1328 05/13/22  0528   WBC 10*3/mm3  --  7.68   HEMOGLOBIN g/dL 9.1* 7.9*   PLATELETS 10*3/mm3  --  328     Results from last 7 days   Lab Units 05/14/22  1105 05/13/22  0528   SODIUM mmol/L  --  136   POTASSIUM mmol/L 3.4* 3.2*   CO2 mmol/L  --  26.0   CREATININE mg/dL  --  0.46*   GLUCOSE mg/dL  --  113*       I have reviewed the medications.    Assessment/Problem List  POD# 4 Days Post-Op   S/p Reverse TSA    Plan     Plan  1) sling  2) OT- to instruct on ADLs, and elbow wrist and hand ROM  3) dressing to remain intact until followup  4) po pain meds, ISB block        Discharge Planning: I expect patient to be discharged to rehab when bed available/eligible          Mushtaq Walsh MD  05/16/22  07:45 EDT

## 2022-05-16 NOTE — DISCHARGE SUMMARY
Patient Name: Heidi Tomlinson  MRN: [id number]  : 1935  DOS: 2022    Attending: Mushtaq Walsh MD    Primary Care Provider: Alejo Negron MD    Date of Admission:.2022  5:53 AM    Date of Discharge:  2022    Discharge Diagnosis:     S/P reverse total shoulder arthroplasty, left    Glenohumeral arthritis, left    Hypokalemia, replaced    Hypomagnesemia, replaced    Postoperative pain      Hospital Course    At admit:    Patient is a pleasant 86 y.o. female presented for scheduled surgery by Dr. Walsh.     She underwent left reverse total shoulder arthroplasty under GA and a block, tolerated surgery well, was admitted for further management.      Seen in her room afterwards, doing fairly well, no complains of nausea, vomiting, or shortness of breath.     Patient has lived independently to this point.  Her son lives at a section of her house and is available to help.    After admit:    Patient was provided pain medications as needed for pain control, along with interscalene nerve block infusion of Ropivacaine.   Nerve block cath out prior to discharge.    Adjustments were made to pain medications to optimize postop pain management.     Risks and benefits of opiate medications discussed with patient. REED report on chart was reviewed.    The patient was seen by OT and was taught exercises for left arm.  The patient used an IS for atelectasis prophylaxis and mechanicals for DVT prophylaxis.    Home medications were resumed as appropriate, and labs were monitored and remained fairly stable.  She required replacement of potassium and magnesium.  Postop anemia has been asymptomatic and did not require transfusion.    With the progress she has made, Ms. Tomlinson is ready for DC to rehab facility today.       Discussed with patient regarding plan and she shows understanding and agreement.    Pt complained of diarrhea on the day of discharge, probiotic and Fiber added  Will check K  "prior to d/c      Procedures Performed  Procedure(s):  TOTAL SHOULDER REVERSE ARTHROPLASTY LEFT WITH SUBSCPULAR REPAIR       Pertinent Test Results:    I reviewed the patient's new clinical results.   Results from last 7 days   Lab Units 22  1328 22  0528 05/10/22  0827   WBC 10*3/mm3  --  7.68 5.81   HEMOGLOBIN g/dL 9.1* 7.9* 10.2*   HEMATOCRIT % 28.2* 24.4* 32.9*   PLATELETS 10*3/mm3  --  328 407     Results from last 7 days   Lab Units 22  1215 22  1105 22  0528 05/10/22  0827   SODIUM mmol/L  --   --  136 136   POTASSIUM mmol/L 3.8 3.4* 3.2* 3.5   CHLORIDE mmol/L  --   --  101 97*   CO2 mmol/L  --   --  26.0 27.0   BUN mg/dL  --   --  8 15   CREATININE mg/dL  --   --  0.46* 0.55*   CALCIUM mg/dL  --   --  8.4* 9.5   BILIRUBIN mg/dL  --   --   --  0.3   ALK PHOS U/L  --   --   --  95   ALT (SGPT) U/L  --   --   --  11   AST (SGOT) U/L  --   --   --  20   GLUCOSE mg/dL  --   --  113* 106*     I reviewed the patient's new imaging including images and reports.      Occupational Therapy  Plan of Care Reviewed With: patient, daughter  Progress: declining  Outcome Evaluation: Pt completed bed mobility with min assist, LB dressing with mod assist and sling management with dependence. She completed transfer training with CGA. Recommend IPR.    Physical therapy  Plan of Care Reviewed With: patient, daughter  Progress: declining  Outcome Evaluation: Pt completed bed mobility with min assist, LB dressing with mod assist and sling management with dependence. She completed transfer training with CGA. Recommend IPR.    Discharge Assessment:       Visit Vitals  /66 (BP Location: Right arm, Patient Position: Sitting)   Pulse 84   Temp 98.3 °F (36.8 °C) (Oral)   Resp 18   Ht 160 cm (63\")   Wt 62.6 kg (138 lb)   SpO2 100%   BMI 24.45 kg/m²     Temp (24hrs), Av.4 °F (36.9 °C), Min:98.1 °F (36.7 °C), Max:98.8 °F (37.1 °C)      General Appearance:    Alert, cooperative, in no acute distress "   Lungs:     Clear to auscultation,respirations regular, even and   unlabored    Heart:    Regular rhythm and normal rate, normal S1 and S2    Abdomen:     Normal bowel sounds, no masses, no organomegaly, soft        non-tender, non-distended, no guarding, no rebound                 tenderness   Extremities:  Left UE in a sling, CDI Aquacel dressing over left shoulder incision .   Distal pulses, cap refill,  intact. Movement and sensation intact distally     Pulses:   Pulses palpable and equal bilaterally   Skin:   No bleeding, bruising or rash        Discharge Disposition: Inpatient rehab, [facility]          Discharge Medications      New Medications      Instructions Start Date   hydrOXYzine 25 MG tablet  Commonly known as: ATARAX   25 mg, Oral, Every 6 Hours PRN      oxyCODONE 5 MG immediate release tablet  Commonly known as: Roxicodone   5 mg, Oral, Every 4 Hours PRN         Continue These Medications      Instructions Start Date   ondansetron 4 MG tablet  Commonly known as: ZOFRAN   Take 1 tablet by mouth Every 8 (Eight) Hours As Needed post op nausea.      potassium chloride 20 MEQ packet  Commonly known as: KLOR-CON   20 mEq, Oral, Daily      traZODone 100 MG tablet  Commonly known as: DESYREL   Every 12 Hours Scheduled      triamterene-hydrochlorothiazide 37.5-25 MG per tablet  Commonly known as: MAXZIDE-25   1 tablet, Oral, Daily         Stop These Medications    diclofenac 1 % gel gel  Commonly known as: VOLTAREN     traMADol 50 MG tablet  Commonly known as: ULTRAM            Discharge Diet:   Diet Instructions    Regular diet       Regular    Activity at Discharge:   Activity Instructions    Activity as tolerated.  NWB left upper extremity, ROM elbow, wrist, and hand per Dr. Walsh's instructions         NWB left upper extremity, ROM elbow, wrist, and hand per Dr. Walsh's instructions.    Follow-up Appointments  Mushtaq Walsh MD per his orders         Yinka Arellano MD  05/16/22  15:27 EDT

## 2022-05-16 NOTE — PROGRESS NOTES
[facility]    Acute pain service Inpatient Progress Note    Patient Name: Heidi Tomlinson  :  1935  MRN:  [id number]        Acute Pain  Service Inpatient Progress Note:    Analgesia:Excellent  Pain Score:2/10  LOC: alert and awake  Resp Status: room air  Cardiac: VS stable  Side Effects:None  Catheter Site:clean, dressing intact and dry  Cath type: peripheral nerve cath with ON Q  Catheter Plan:Catheter removed and tip intact

## (undated) DEVICE — GLV SURG SENSICARE PI ORTHO SZ7.5 LF STRL

## (undated) DEVICE — INTENDED FOR TISSUE SEPARATION, AND OTHER PROCEDURES THAT REQUIRE A SHARP SURGICAL BLADE TO PUNCTURE OR CUT.: Brand: BARD-PARKER ® CARBON RIB-BACK BLADES

## (undated) DEVICE — GLV SURG SENSICARE PI MIC PF SZ7.5 LF STRL

## (undated) DEVICE — UNDERGLV SURG BIOGEL INDICAT PI SZ8 BLU

## (undated) DEVICE — GLV SURG PREMIERPRO MIC LTX PF SZ6.5 BRN

## (undated) DEVICE — SOL ISO/ALC RUB 70PCT 4OZ

## (undated) DEVICE — SPNG GZ WOVN 4X4IN 12PLY 10/BX STRL

## (undated) DEVICE — DRAPE,REIN 53X77,STERILE: Brand: MEDLINE

## (undated) DEVICE — UNDERGLV SURG BIOGEL INDICAT PF 61/2 GRN

## (undated) DEVICE — PUMP PAIN AUTOFUSER AUTO SELCT NOBOLUS 1TO14ML/HR 550ML DISP

## (undated) DEVICE — STRYKER PERFORMANCE SERIES SAGITTAL BLADE: Brand: STRYKER PERFORMANCE SERIES

## (undated) DEVICE — TBG PENCL TELESCP MEGADYNE SMOKE EVAC 10FT

## (undated) DEVICE — 450 ML BOTTLE OF 0.05% CHLORHEXIDINE GLUCONATE IN 99.95% STERILE WATER FOR IRRIGATION, USP AND APPLICATOR.: Brand: IRRISEPT ANTIMICROBIAL WOUND LAVAGE

## (undated) DEVICE — ANTIBACTERIAL UNDYED BRAIDED (POLYGLACTIN 910), SYNTHETIC ABSORBABLE SUTURE: Brand: COATED VICRYL

## (undated) DEVICE — DRSNG SURG AQUACEL AG/ADVNTGE 9X25CM 3.5X10IN

## (undated) DEVICE — PULLOVER TOGA, 2X LARGE: Brand: FLYTE, SURGICOOL

## (undated) DEVICE — T-MAX DISPOSABLE FACE MASK 8 PER BOX

## (undated) DEVICE — 3M™ IOBAN™ 2 ANTIMICROBIAL INCISE DRAPE 6651EZ: Brand: IOBAN™ 2

## (undated) DEVICE — TRAP FLD MINIVAC MEGADYNE 100ML

## (undated) DEVICE — 3M™ STERI-DRAPE™ U-DRAPE 1015: Brand: STERI-DRAPE™

## (undated) DEVICE — PK MAJ SHLDR SPLT 10

## (undated) DEVICE — BLANKT WARM LOWR/BDY 100X120CM

## (undated) DEVICE — SUT VIC 2/0 CT2 27IN J269H

## (undated) DEVICE — PATIENT RETURN ELECTRODE, SINGLE-USE, CONTACT QUALITY MONITORING, ADULT, WITH 9FT CORD, FOR PATIENTS WEIGING OVER 33LBS. (15KG): Brand: MEGADYNE

## (undated) DEVICE — 1010 S-DRAPE TOWEL DRAPE 10/BX: Brand: STERI-DRAPE™

## (undated) DEVICE — SLNG ULTRSLING2 11IN SM

## (undated) DEVICE — SWABSTK SKINPREP PVPI PRE/SAT 8IN STRL